# Patient Record
Sex: MALE | Race: BLACK OR AFRICAN AMERICAN | Employment: UNEMPLOYED | URBAN - METROPOLITAN AREA
[De-identification: names, ages, dates, MRNs, and addresses within clinical notes are randomized per-mention and may not be internally consistent; named-entity substitution may affect disease eponyms.]

---

## 2017-02-21 ENCOUNTER — HOSPITAL ENCOUNTER (EMERGENCY)
Age: 32
Discharge: HOME | End: 2017-02-22
Attending: EMERGENCY MEDICINE
Payer: SELF-PAY

## 2017-02-21 DIAGNOSIS — R07.9 CHEST PAIN, UNSPECIFIED TYPE: Primary | ICD-10-CM

## 2017-02-21 PROCEDURE — 96374 THER/PROPH/DIAG INJ IV PUSH: CPT

## 2017-02-21 PROCEDURE — 96375 TX/PRO/DX INJ NEW DRUG ADDON: CPT

## 2017-02-21 PROCEDURE — 93005 ELECTROCARDIOGRAM TRACING: CPT

## 2017-02-21 PROCEDURE — 99283 EMERGENCY DEPT VISIT LOW MDM: CPT

## 2017-02-21 NOTE — LETTER
NOTIFICATION RETURN TO WORK / SCHOOL 
 
2/22/2017 1:21 AM 
 
Mr. Montez Snellen [address] To Whom It May Concern: Montez Snellen is currently under the care of [id number] [place] [address] EMERGENCY DEPT. He will return to work/school on: 2/24/2017 If there are questions or concerns please have the patient contact our office.  
 
 
 
Sincerely, 
 
 
 
 
Vanda Pierce MD

## 2017-02-22 ENCOUNTER — APPOINTMENT (OUTPATIENT)
Dept: GENERAL RADIOLOGY | Age: 32
End: 2017-02-22
Attending: EMERGENCY MEDICINE
Payer: SELF-PAY

## 2017-02-22 VITALS
SYSTOLIC BLOOD PRESSURE: 128 MMHG | TEMPERATURE: 98 F | HEIGHT: 74 IN | BODY MASS INDEX: 24.38 KG/M2 | WEIGHT: 190 LBS | RESPIRATION RATE: 14 BRPM | OXYGEN SATURATION: 99 % | HEART RATE: 61 BPM | DIASTOLIC BLOOD PRESSURE: 82 MMHG

## 2017-02-22 LAB
ANION GAP BLD CALC-SCNC: 9 MMOL/L (ref 3–18)
ATRIAL RATE: 58 BPM
BASOPHILS # BLD AUTO: 0 K/UL (ref 0–0.06)
BASOPHILS # BLD: 1 % (ref 0–2)
BUN SERPL-MCNC: 16 MG/DL (ref 7–18)
BUN/CREAT SERPL: 15 (ref 12–20)
CALCIUM SERPL-MCNC: 9.1 MG/DL (ref 8.5–10.1)
CALCULATED P AXIS, ECG09: 73 DEGREES
CALCULATED R AXIS, ECG10: 77 DEGREES
CALCULATED T AXIS, ECG11: 42 DEGREES
CHLORIDE SERPL-SCNC: 105 MMOL/L (ref 100–108)
CK MB CFR SERPL CALC: NORMAL % (ref 0–4)
CK MB SERPL-MCNC: <1 NG/ML (ref 5–25)
CK SERPL-CCNC: 240 U/L (ref 39–308)
CO2 SERPL-SCNC: 26 MMOL/L (ref 21–32)
CREAT SERPL-MCNC: 1.07 MG/DL (ref 0.6–1.3)
D DIMER PPP FEU-MCNC: <0.27 UG/ML(FEU)
DIAGNOSIS, 93000: NORMAL
DIFFERENTIAL METHOD BLD: ABNORMAL
EOSINOPHIL # BLD: 0.1 K/UL (ref 0–0.4)
EOSINOPHIL NFR BLD: 2 % (ref 0–5)
ERYTHROCYTE [DISTWIDTH] IN BLOOD BY AUTOMATED COUNT: 13.6 % (ref 11.6–14.5)
GLUCOSE SERPL-MCNC: 100 MG/DL (ref 74–99)
HCT VFR BLD AUTO: 39.2 % (ref 36–48)
HGB BLD-MCNC: 12.8 G/DL (ref 13–16)
LYMPHOCYTES # BLD AUTO: 59 % (ref 21–52)
LYMPHOCYTES # BLD: 3.2 K/UL (ref 0.9–3.6)
MCH RBC QN AUTO: 30.6 PG (ref 24–34)
MCHC RBC AUTO-ENTMCNC: 32.7 G/DL (ref 31–37)
MCV RBC AUTO: 93.8 FL (ref 74–97)
MONOCYTES # BLD: 0.3 K/UL (ref 0.05–1.2)
MONOCYTES NFR BLD AUTO: 5 % (ref 3–10)
NEUTS SEG # BLD: 1.8 K/UL (ref 1.8–8)
NEUTS SEG NFR BLD AUTO: 33 % (ref 40–73)
P-R INTERVAL, ECG05: 154 MS
PLATELET # BLD AUTO: 202 K/UL (ref 135–420)
PMV BLD AUTO: 10.1 FL (ref 9.2–11.8)
POTASSIUM SERPL-SCNC: 4.1 MMOL/L (ref 3.5–5.5)
Q-T INTERVAL, ECG07: 400 MS
QRS DURATION, ECG06: 84 MS
QTC CALCULATION (BEZET), ECG08: 392 MS
RBC # BLD AUTO: 4.18 M/UL (ref 4.7–5.5)
SODIUM SERPL-SCNC: 140 MMOL/L (ref 136–145)
TROPONIN I SERPL-MCNC: <0.02 NG/ML (ref 0–0.06)
VENTRICULAR RATE, ECG03: 58 BPM
WBC # BLD AUTO: 5.3 K/UL (ref 4.6–13.2)

## 2017-02-22 PROCEDURE — 85025 COMPLETE CBC W/AUTO DIFF WBC: CPT | Performed by: EMERGENCY MEDICINE

## 2017-02-22 PROCEDURE — 74011000250 HC RX REV CODE- 250: Performed by: EMERGENCY MEDICINE

## 2017-02-22 PROCEDURE — 82550 ASSAY OF CK (CPK): CPT | Performed by: EMERGENCY MEDICINE

## 2017-02-22 PROCEDURE — 74011250636 HC RX REV CODE- 250/636: Performed by: EMERGENCY MEDICINE

## 2017-02-22 PROCEDURE — 71020 XR CHEST PA LAT: CPT

## 2017-02-22 PROCEDURE — 85379 FIBRIN DEGRADATION QUANT: CPT | Performed by: EMERGENCY MEDICINE

## 2017-02-22 PROCEDURE — 80048 BASIC METABOLIC PNL TOTAL CA: CPT | Performed by: EMERGENCY MEDICINE

## 2017-02-22 RX ORDER — FAMOTIDINE 20 MG/1
20 TABLET, FILM COATED ORAL DAILY
Qty: 10 TAB | Refills: 0 | Status: SHIPPED | OUTPATIENT
Start: 2017-02-22 | End: 2017-03-04

## 2017-02-22 RX ORDER — KETOROLAC TROMETHAMINE 30 MG/ML
30 INJECTION, SOLUTION INTRAMUSCULAR; INTRAVENOUS
Status: COMPLETED | OUTPATIENT
Start: 2017-02-22 | End: 2017-02-22

## 2017-02-22 RX ORDER — FAMOTIDINE 10 MG/ML
20 INJECTION INTRAVENOUS
Status: COMPLETED | OUTPATIENT
Start: 2017-02-22 | End: 2017-02-22

## 2017-02-22 RX ORDER — KETOROLAC TROMETHAMINE 10 MG/1
10 TABLET, FILM COATED ORAL
Qty: 14 TAB | Refills: 0 | Status: SHIPPED | OUTPATIENT
Start: 2017-02-22 | End: 2019-11-14

## 2017-02-22 RX ADMIN — KETOROLAC TROMETHAMINE 30 MG: 30 INJECTION, SOLUTION INTRAMUSCULAR at 00:30

## 2017-02-22 RX ADMIN — FAMOTIDINE 20 MG: 10 INJECTION, SOLUTION INTRAVENOUS at 00:30

## 2017-02-22 NOTE — DISCHARGE INSTRUCTIONS
Return for any new or worsening pain, fever, shortness of breath, vomiting, decreased fluid intake, weakness, numbness, dizziness, or any change or concerns. Chest Pain: Care Instructions  Your Care Instructions  There are many things that can cause chest pain. Some are not serious and will get better on their own in a few days. But some kinds of chest pain need more testing and treatment. Your doctor may have recommended a follow-up visit in the next 8 to 12 hours. If you are not getting better, you may need more tests or treatment. Even though your doctor has released you, you still need to watch for any problems. The doctor carefully checked you, but sometimes problems can develop later. If you have new symptoms or if your symptoms do not get better, get medical care right away. If you have worse or different chest pain or pressure that lasts more than 5 minutes or you passed out (lost consciousness), call 911 or seek other emergency help right away. A medical visit is only one step in your treatment. Even if you feel better, you still need to do what your doctor recommends, such as going to all suggested follow-up appointments and taking medicines exactly as directed. This will help you recover and help prevent future problems. How can you care for yourself at home? · Rest until you feel better. · Take your medicine exactly as prescribed. Call your doctor if you think you are having a problem with your medicine. · Do not drive after taking a prescription pain medicine. When should you call for help? Call 911 if:  · You passed out (lost consciousness). · You have severe difficulty breathing. · You have symptoms of a heart attack. These may include:  ¨ Chest pain or pressure, or a strange feeling in your chest.  ¨ Sweating. ¨ Shortness of breath. ¨ Nausea or vomiting.   ¨ Pain, pressure, or a strange feeling in your back, neck, jaw, or upper belly or in one or both shoulders or arms.  ¨ Lightheadedness or sudden weakness. ¨ A fast or irregular heartbeat. After you call 911, the  may tell you to chew 1 adult-strength or 2 to 4 low-dose aspirin. Wait for an ambulance. Do not try to drive yourself. Call your doctor today if:  · You have any trouble breathing. · Your chest pain gets worse. · You are dizzy or lightheaded, or you feel like you may faint. · You are not getting better as expected. · You are having new or different chest pain. Where can you learn more? Go to http://cayla-kathy.info/. Enter A120 in the search box to learn more about \"Chest Pain: Care Instructions. \"  Current as of: May 27, 2016  Content Version: 11.1  © 6388-4445 [employer]. Care instructions adapted under license by [employer] (which disclaims liability or warranty for this information). If you have questions about a medical condition or this instruction, always ask your healthcare professional. [place] [address] any warranty or liability for your use of this information.

## 2017-02-22 NOTE — ED PROVIDER NOTES
HPI Comments: 12:09 AM Jani Delgadillo is a 33 y.o. male with history of acid reflux who presents to the ED c/o midsternum chest pain onset 0130 this morning. Pt states pain eases when sitting up, and loss of appetite due to pain. Pain also persists in lower back. Pt states he has left arm numbness from his fingers to his elbow. Pt denies any abdomen pain, nausea, fever, cough, leg swelling, or any other symptoms at this time. Pt admits to smoking, but no alcohol use. PCP: None    The history is provided by the patient. Past Medical History:   Diagnosis Date    Gastroenteritis        History reviewed. No pertinent surgical history. History reviewed. No pertinent family history. Social History     Social History    Marital status: SINGLE     Spouse name: N/A    Number of children: N/A    Years of education: N/A     Occupational History    Not on file. Social History Main Topics    Smoking status: Never Smoker    Smokeless tobacco: Not on file    Alcohol use No    Drug use: Yes     Special: Marijuana      Comment: States daily marijuana use.  Sexual activity: Not on file     Other Topics Concern    Not on file     Social History Narrative         ALLERGIES: Review of patient's allergies indicates no known allergies. Review of Systems   Constitutional: Negative for diaphoresis and fever. HENT: Negative for congestion. Respiratory: Negative for cough and shortness of breath. Cardiovascular: Positive for chest pain. Negative for leg swelling. Gastrointestinal: Negative for abdominal pain and nausea. Musculoskeletal: Positive for back pain (lower). Skin: Negative for rash. Neurological: Positive for numbness (left arm from finger to elbow). Negative for dizziness. All other systems reviewed and are negative.       Vitals:    02/21/17 2325 02/22/17 0129   BP: 132/80 128/82   Pulse: 67 61   Resp: 17 14   Temp: 97.8 °F (36.6 °C) 98 °F (36.7 °C)   SpO2: 98% 99% Weight: 86.2 kg (190 lb)    Height: 6' 2\" (1.88 m)             Physical Exam   Constitutional: He is oriented to person, place, and time. He appears well-developed and well-nourished. HENT:   Head: Normocephalic and atraumatic. Eyes: Pupils are equal, round, and reactive to light. Neck: Neck supple. Cardiovascular: Normal rate. No murmur heard. Pulmonary/Chest: Effort normal. He has no wheezes. Abdominal: Soft. There is no tenderness. Musculoskeletal: He exhibits no tenderness. Neurological: He is alert and oriented to person, place, and time. Skin: No pallor. Nursing note and vitals reviewed.        [facility]  ED Course       Procedures      Vitals:  Patient Vitals for the past 12 hrs:   Temp Pulse Resp BP SpO2   02/22/17 0129 98 °F (36.7 °C) 61 14 128/82 99 %   02/21/17 2325 97.8 °F (36.6 °C) 67 17 132/80 98 %       Pulse ox reviewed    Medications ordered:   Medications   famotidine (PF) (PEPCID) injection 20 mg (20 mg IntraVENous Given 2/22/17 0030)   ketorolac (TORADOL) injection 30 mg (30 mg IntraVENous Given 2/22/17 0030)         Lab findings:  Recent Results (from the past 12 hour(s))   EKG, 12 LEAD, INITIAL    Collection Time: 02/21/17 11:22 PM   Result Value Ref Range    Ventricular Rate 58 BPM    Atrial Rate 58 BPM    P-R Interval 154 ms    QRS Duration 84 ms    Q-T Interval 400 ms    QTC Calculation (Bezet) 392 ms    Calculated P Axis 73 degrees    Calculated R Axis 77 degrees    Calculated T Axis 42 degrees    Diagnosis       Sinus bradycardia  Septal infarct , age undetermined  Abnormal ECG  No previous ECGs available     CBC WITH AUTOMATED DIFF    Collection Time: 02/22/17 12:30 AM   Result Value Ref Range    WBC 5.3 4.6 - 13.2 K/uL    RBC 4.18 (L) 4.70 - 5.50 M/uL    HGB 12.8 (L) 13.0 - 16.0 g/dL    HCT 39.2 36.0 - 48.0 %    MCV 93.8 74.0 - 97.0 FL    MCH 30.6 24.0 - 34.0 PG    MCHC 32.7 31.0 - 37.0 g/dL    RDW 13.6 11.6 - 14.5 %    PLATELET 590 525 - 311 K/uL    MPV 10.1 9.2 - 11.8 FL    NEUTROPHILS 33 (L) 40 - 73 %    LYMPHOCYTES 59 (H) 21 - 52 %    MONOCYTES 5 3 - 10 %    EOSINOPHILS 2 0 - 5 %    BASOPHILS 1 0 - 2 %    ABS. NEUTROPHILS 1.8 1.8 - 8.0 K/UL    ABS. LYMPHOCYTES 3.2 0.9 - 3.6 K/UL    ABS. MONOCYTES 0.3 0.05 - 1.2 K/UL    ABS. EOSINOPHILS 0.1 0.0 - 0.4 K/UL    ABS. BASOPHILS 0.0 0.0 - 0.06 K/UL    DF AUTOMATED     METABOLIC PANEL, BASIC    Collection Time: 02/22/17 12:30 AM   Result Value Ref Range    Sodium 140 136 - 145 mmol/L    Potassium 4.1 3.5 - 5.5 mmol/L    Chloride 105 100 - 108 mmol/L    CO2 26 21 - 32 mmol/L    Anion gap 9 3.0 - 18 mmol/L    Glucose 100 (H) 74 - 99 mg/dL    BUN 16 7.0 - 18 MG/DL    Creatinine 1.07 0.6 - 1.3 MG/DL    BUN/Creatinine ratio 15 12 - 20      GFR est AA >60 >60 ml/min/1.73m2    GFR est non-AA >60 >60 ml/min/1.73m2    Calcium 9.1 8.5 - 10.1 MG/DL   CARDIAC PANEL,(CK, CKMB & TROPONIN)    Collection Time: 02/22/17 12:30 AM   Result Value Ref Range     39 - 308 U/L    CK - MB <1.0 <3.6 ng/ml    CK-MB Index CANNOT BE CALCULATED 0.0 - 4.0 %    Troponin-I, Qt. <0.02 0.00 - 0.06 NG/ML   D DIMER    Collection Time: 02/22/17 12:30 AM   Result Value Ref Range    D DIMER <0.27 <0.46 ug/ml(FEU)           X-Ray, CT or other radiology findings or impressions:  XR CHEST PA LAT    (Results Pending)       Progress notes, Consult notes or additional Procedure notes:   1:21 AM I have reassessed the patient and discussed their results and diagnosis. Not c/w cad/pe/pna/ptx. No emc. Pt will be discharged in stable condition. Patient is to return to emergency department if any new or worsening condition. Patient understands and verbalizes agreement with plan. Disposition:  Diagnosis:   1.  Chest pain, unspecified type        Disposition: Discharged    Follow-up Information     Follow up With Details Comments Contact Info    [facility] Schedule an appointment as soon as possible for a visit in 2 days  [address] [facility]  [phone]           Discharge Medication List as of 2/22/2017  1:22 AM      START taking these medications    Details   famotidine (PEPCID) 20 mg tablet Take 1 Tab by mouth daily for 10 days. , Print, Disp-10 Tab, R-0      ketorolac (TORADOL) 10 mg tablet Take 1 Tab by mouth every eight (8) hours as needed for Pain., Print, Disp-14 Tab, R-0         CONTINUE these medications which have NOT CHANGED    Details   ondansetron hcl (ZOFRAN) 4 mg tablet Take 1 Tab by mouth every eight (8) hours as needed for Nausea. Print, 4 mg, Disp-12 Tab, R-0               SCRIBE ATTESTATION STATEMENT  Documented by: Stoney Layne scribing for, and in the presence of, Rc Saeed MD 12:16 AM     Signed by: Payam Polo, 02/22/17 12:16 AM    PROVIDER ATTESTATION STATEMENT  I personally performed the services described in the documentation, reviewed the documentation, as recorded by the scribe in my presence, and it accurately and completely records my words and actions.   Rc Saeed MD

## 2017-02-22 NOTE — ED NOTES
Reports a decrease in pain at the present time. I have reviewed discharge instructions with the patient and spouse. The patient and spouse verbalized understanding. Ambulatory from ED. Patient armband removed and shredded.

## 2017-02-22 NOTE — ED NOTES
Pt describes chest pain as more epigastric, does suffer from GERD but Tums and milk have not relieved the pain at all.

## 2017-02-22 NOTE — ED TRIAGE NOTES
Sunday Morning tingling feeling from elbow to forearm. Monday same pain worse. Onset today at 0100 chest pain.

## 2018-10-07 ENCOUNTER — HOSPITAL ENCOUNTER (EMERGENCY)
Age: 33
Discharge: HOME | End: 2018-10-07
Attending: EMERGENCY MEDICINE
Payer: SELF-PAY

## 2018-10-07 VITALS
TEMPERATURE: 97.4 F | HEIGHT: 73 IN | SYSTOLIC BLOOD PRESSURE: 108 MMHG | OXYGEN SATURATION: 99 % | BODY MASS INDEX: 23.86 KG/M2 | RESPIRATION RATE: 27 BRPM | DIASTOLIC BLOOD PRESSURE: 61 MMHG | HEART RATE: 64 BPM | WEIGHT: 180 LBS

## 2018-10-07 DIAGNOSIS — R11.2 NAUSEA AND VOMITING, INTRACTABILITY OF VOMITING NOT SPECIFIED, UNSPECIFIED VOMITING TYPE: ICD-10-CM

## 2018-10-07 DIAGNOSIS — R55 SYNCOPE AND COLLAPSE: ICD-10-CM

## 2018-10-07 DIAGNOSIS — R10.84 GENERALIZED ABDOMINAL PAIN: Primary | ICD-10-CM

## 2018-10-07 LAB
ALBUMIN SERPL-MCNC: 4.5 G/DL (ref 3.4–5)
ALBUMIN/GLOB SERPL: 1.2 {RATIO} (ref 0.8–1.7)
ALP SERPL-CCNC: 64 U/L (ref 45–117)
ALT SERPL-CCNC: 25 U/L (ref 16–61)
ANION GAP SERPL CALC-SCNC: 10 MMOL/L (ref 3–18)
APPEARANCE UR: CLEAR
AST SERPL-CCNC: 20 U/L (ref 15–37)
BASOPHILS # BLD: 0 K/UL (ref 0–0.1)
BASOPHILS NFR BLD: 0 % (ref 0–2)
BILIRUB SERPL-MCNC: 0.4 MG/DL (ref 0.2–1)
BILIRUB UR QL: NEGATIVE
BUN SERPL-MCNC: 16 MG/DL (ref 7–18)
BUN/CREAT SERPL: 15 (ref 12–20)
CALCIUM SERPL-MCNC: 9.4 MG/DL (ref 8.5–10.1)
CHLORIDE SERPL-SCNC: 103 MMOL/L (ref 100–108)
CK MB CFR SERPL CALC: NORMAL % (ref 0–4)
CK MB SERPL-MCNC: <1 NG/ML (ref 5–25)
CK SERPL-CCNC: 202 U/L (ref 39–308)
CO2 SERPL-SCNC: 23 MMOL/L (ref 21–32)
COLOR UR: YELLOW
CREAT SERPL-MCNC: 1.08 MG/DL (ref 0.6–1.3)
DIFFERENTIAL METHOD BLD: ABNORMAL
EOSINOPHIL # BLD: 0 K/UL (ref 0–0.4)
EOSINOPHIL NFR BLD: 0 % (ref 0–5)
ERYTHROCYTE [DISTWIDTH] IN BLOOD BY AUTOMATED COUNT: 13.5 % (ref 11.6–14.5)
GLOBULIN SER CALC-MCNC: 3.9 G/DL (ref 2–4)
GLUCOSE SERPL-MCNC: 102 MG/DL (ref 74–99)
GLUCOSE UR STRIP.AUTO-MCNC: NEGATIVE MG/DL
HCT VFR BLD AUTO: 39.6 % (ref 36–48)
HGB BLD-MCNC: 14 G/DL (ref 13–16)
HGB UR QL STRIP: NEGATIVE
KETONES UR QL STRIP.AUTO: 80 MG/DL
LEUKOCYTE ESTERASE UR QL STRIP.AUTO: NEGATIVE
LIPASE SERPL-CCNC: 88 U/L (ref 73–393)
LYMPHOCYTES # BLD: 1.7 K/UL (ref 0.9–3.6)
LYMPHOCYTES NFR BLD: 22 % (ref 21–52)
MCH RBC QN AUTO: 31.5 PG (ref 24–34)
MCHC RBC AUTO-ENTMCNC: 35.4 G/DL (ref 31–37)
MCV RBC AUTO: 89.2 FL (ref 74–97)
MONOCYTES # BLD: 0.3 K/UL (ref 0.05–1.2)
MONOCYTES NFR BLD: 4 % (ref 3–10)
NEUTS SEG # BLD: 5.7 K/UL (ref 1.8–8)
NEUTS SEG NFR BLD: 74 % (ref 40–73)
NITRITE UR QL STRIP.AUTO: NEGATIVE
PH UR STRIP: 5.5 [PH] (ref 5–8)
PLATELET # BLD AUTO: 227 K/UL (ref 135–420)
PMV BLD AUTO: 10.1 FL (ref 9.2–11.8)
POTASSIUM SERPL-SCNC: 4.1 MMOL/L (ref 3.5–5.5)
PROT SERPL-MCNC: 8.4 G/DL (ref 6.4–8.2)
PROT UR STRIP-MCNC: NEGATIVE MG/DL
RBC # BLD AUTO: 4.44 M/UL (ref 4.7–5.5)
SODIUM SERPL-SCNC: 136 MMOL/L (ref 136–145)
SP GR UR REFRACTOMETRY: 1.02 (ref 1–1.03)
TROPONIN I SERPL-MCNC: <0.02 NG/ML (ref 0–0.04)
UROBILINOGEN UR QL STRIP.AUTO: 1 EU/DL (ref 0.2–1)
WBC # BLD AUTO: 7.6 K/UL (ref 4.6–13.2)

## 2018-10-07 PROCEDURE — C9113 INJ PANTOPRAZOLE SODIUM, VIA: HCPCS | Performed by: EMERGENCY MEDICINE

## 2018-10-07 PROCEDURE — 99285 EMERGENCY DEPT VISIT HI MDM: CPT

## 2018-10-07 PROCEDURE — 96361 HYDRATE IV INFUSION ADD-ON: CPT

## 2018-10-07 PROCEDURE — 83690 ASSAY OF LIPASE: CPT | Performed by: EMERGENCY MEDICINE

## 2018-10-07 PROCEDURE — 82550 ASSAY OF CK (CPK): CPT | Performed by: PHYSICIAN ASSISTANT

## 2018-10-07 PROCEDURE — 96374 THER/PROPH/DIAG INJ IV PUSH: CPT

## 2018-10-07 PROCEDURE — 74011250636 HC RX REV CODE- 250/636: Performed by: EMERGENCY MEDICINE

## 2018-10-07 PROCEDURE — 96375 TX/PRO/DX INJ NEW DRUG ADDON: CPT

## 2018-10-07 PROCEDURE — 93005 ELECTROCARDIOGRAM TRACING: CPT

## 2018-10-07 PROCEDURE — 85025 COMPLETE CBC W/AUTO DIFF WBC: CPT | Performed by: EMERGENCY MEDICINE

## 2018-10-07 PROCEDURE — 81003 URINALYSIS AUTO W/O SCOPE: CPT | Performed by: PHYSICIAN ASSISTANT

## 2018-10-07 PROCEDURE — 80053 COMPREHEN METABOLIC PANEL: CPT | Performed by: EMERGENCY MEDICINE

## 2018-10-07 RX ORDER — PANTOPRAZOLE SODIUM 40 MG/1
40 TABLET, DELAYED RELEASE ORAL DAILY
Qty: 20 TAB | Refills: 0 | Status: SHIPPED | OUTPATIENT
Start: 2018-10-07 | End: 2018-10-27

## 2018-10-07 RX ORDER — ONDANSETRON 2 MG/ML
4 INJECTION INTRAMUSCULAR; INTRAVENOUS
Status: COMPLETED | OUTPATIENT
Start: 2018-10-07 | End: 2018-10-07

## 2018-10-07 RX ORDER — ONDANSETRON 4 MG/1
4 TABLET, FILM COATED ORAL
Qty: 12 TAB | Refills: 0 | Status: SHIPPED | OUTPATIENT
Start: 2018-10-07 | End: 2019-11-14

## 2018-10-07 RX ORDER — PANTOPRAZOLE SODIUM 40 MG/10ML
40 INJECTION, POWDER, LYOPHILIZED, FOR SOLUTION INTRAVENOUS
Status: COMPLETED | OUTPATIENT
Start: 2018-10-07 | End: 2018-10-07

## 2018-10-07 RX ADMIN — SODIUM CHLORIDE 1000 ML: 900 INJECTION, SOLUTION INTRAVENOUS at 15:29

## 2018-10-07 RX ADMIN — PANTOPRAZOLE SODIUM 40 MG: 40 INJECTION, POWDER, FOR SOLUTION INTRAVENOUS at 15:34

## 2018-10-07 RX ADMIN — ONDANSETRON 4 MG: 2 INJECTION INTRAMUSCULAR; INTRAVENOUS at 15:31

## 2018-10-07 NOTE — ED TRIAGE NOTES
Per EMS, Patient states he was eating hot foods yesterday with his friends and drinking. He is complaining of abdominal pain and a headache.  Patient has a hx of GERD

## 2018-10-07 NOTE — ED NOTES
Patient resting comfortably at this time on the stretcher and does not appear to be in any distress.

## 2018-10-07 NOTE — ED PROVIDER NOTES
EMERGENCY DEPARTMENT HISTORY AND PHYSICAL EXAM 
 
Date: 10/7/2018 Patient Name: Marcos Randall History of Presenting Illness Chief Complaint Patient presents with  Abdominal Pain History Provided By: Patient Chief Complaint: syncope, abdominal pain, n/v Duration: 1 Days Timing:  Acute Location: generalized Quality: Aching Severity: 8 out of 10 Modifying Factors: na 
Associated Symptoms: fatigue, Additional History (Context): Marcos Randall is a 27 y.o. male with history of  No significant past medical history who presents to the ED with a complaint of syncope, abdominal pain, nausea and vomiting and body ache. Pt states he was drinking and smoking with friends last night when he syncopated 2 times. He states he only had 2 beers last night. This morning on waking he experienced abdominal pain and then vomited 2 times. Abdominal pain is generalized. He states he as no appetite. He denies fever, CP, SOB, diarrhea, constipation,. He denies drug use. PCP: None Current Outpatient Prescriptions Medication Sig Dispense Refill  pantoprazole (PROTONIX) 40 mg tablet Take 1 Tab by mouth daily for 20 days. 20 Tab 0  
 ondansetron hcl (ZOFRAN) 4 mg tablet Take 1 Tab by mouth every eight (8) hours as needed for Nausea. 12 Tab 0  
 ketorolac (TORADOL) 10 mg tablet Take 1 Tab by mouth every eight (8) hours as needed for Pain. 14 Tab 0 Past History Past Medical History: 
Past Medical History:  
Diagnosis Date  Gastroenteritis Past Surgical History: No past surgical history on file. Family History: No family history on file. Social History: 
Social History Substance Use Topics  Smoking status: Never Smoker  Smokeless tobacco: Not on file  Alcohol use No  
 
 
Allergies: Allergies Allergen Reactions  Ibuprofen Hives Review of Systems Review of Systems Constitutional: Positive for fatigue. Negative for fever. HENT: Negative for congestion. Eyes: Negative for pain. Respiratory: Negative for cough and shortness of breath. Cardiovascular: Negative for chest pain. Gastrointestinal: Positive for abdominal pain, nausea and vomiting. Negative for diarrhea. Genitourinary: Negative for dysuria. Musculoskeletal: Negative for back pain. Skin: Negative for wound. Neurological: Positive for syncope. Negative for dizziness and headaches. All other systems reviewed and are negative. All Other Systems Negative Physical Exam  
 
Vitals:  
 10/07/18 1530 10/07/18 1545 10/07/18 1600 10/07/18 1615 BP: 108/64 119/77 106/75 112/70 Pulse: (!) 58 60 60 (!) 56 Resp: 9 10 15 15 Temp:      
SpO2: 100% 100% 99% 100% Weight:      
Height:      
 
Physical Exam  
Constitutional: He is oriented to person, place, and time. He appears well-developed and well-nourished. No distress. HENT:  
Head: Normocephalic and atraumatic. Nose: Nose normal.  
Eyes: Conjunctivae and EOM are normal. Pupils are equal, round, and reactive to light. Neck: Normal range of motion. Cardiovascular: Normal rate. Pulmonary/Chest: Effort normal and breath sounds normal. No respiratory distress. He has no wheezes. Abdominal: Soft. Normal appearance. He exhibits no distension. Bowel sounds are increased. There is generalized tenderness. There is no rigidity, no rebound, no guarding, no CVA tenderness, no tenderness at McBurney's point and negative Finch's sign. Musculoskeletal: Normal range of motion. Neurological: He is alert and oriented to person, place, and time. Skin: Skin is warm and dry. Psychiatric: He has a normal mood and affect. His behavior is normal.  
Vitals reviewed. Diagnostic Study Results Labs - Recent Results (from the past 12 hour(s)) CBC WITH AUTOMATED DIFF Collection Time: 10/07/18  1:37 PM  
Result Value Ref Range WBC 7.6 4.6 - 13.2 K/uL  
 RBC 4.44 (L) 4.70 - 5.50 M/uL HGB 14.0 13.0 - 16.0 g/dL HCT 39.6 36.0 - 48.0 % MCV 89.2 74.0 - 97.0 FL  
 MCH 31.5 24.0 - 34.0 PG  
 MCHC 35.4 31.0 - 37.0 g/dL  
 RDW 13.5 11.6 - 14.5 % PLATELET 307 935 - 440 K/uL MPV 10.1 9.2 - 11.8 FL  
 NEUTROPHILS 74 (H) 40 - 73 % LYMPHOCYTES 22 21 - 52 % MONOCYTES 4 3 - 10 % EOSINOPHILS 0 0 - 5 % BASOPHILS 0 0 - 2 %  
 ABS. NEUTROPHILS 5.7 1.8 - 8.0 K/UL  
 ABS. LYMPHOCYTES 1.7 0.9 - 3.6 K/UL  
 ABS. MONOCYTES 0.3 0.05 - 1.2 K/UL  
 ABS. EOSINOPHILS 0.0 0.0 - 0.4 K/UL  
 ABS. BASOPHILS 0.0 0.0 - 0.1 K/UL  
 DF AUTOMATED METABOLIC PANEL, COMPREHENSIVE Collection Time: 10/07/18  1:37 PM  
Result Value Ref Range Sodium 136 136 - 145 mmol/L Potassium 4.1 3.5 - 5.5 mmol/L Chloride 103 100 - 108 mmol/L  
 CO2 23 21 - 32 mmol/L Anion gap 10 3.0 - 18 mmol/L Glucose 102 (H) 74 - 99 mg/dL BUN 16 7.0 - 18 MG/DL Creatinine 1.08 0.6 - 1.3 MG/DL  
 BUN/Creatinine ratio 15 12 - 20 GFR est AA >60 >60 ml/min/1.73m2 GFR est non-AA >60 >60 ml/min/1.73m2 Calcium 9.4 8.5 - 10.1 MG/DL Bilirubin, total 0.4 0.2 - 1.0 MG/DL  
 ALT (SGPT) 25 16 - 61 U/L  
 AST (SGOT) 20 15 - 37 U/L Alk. phosphatase 64 45 - 117 U/L Protein, total 8.4 (H) 6.4 - 8.2 g/dL Albumin 4.5 3.4 - 5.0 g/dL Globulin 3.9 2.0 - 4.0 g/dL A-G Ratio 1.2 0.8 - 1.7 LIPASE Collection Time: 10/07/18  1:37 PM  
Result Value Ref Range Lipase 88 73 - 393 U/L  
CARDIAC PANEL,(CK, CKMB & TROPONIN) Collection Time: 10/07/18  1:37 PM  
Result Value Ref Range  39 - 308 U/L  
 CK - MB <1.0 <3.6 ng/ml CK-MB Index  0.0 - 4.0 % CALCULATION NOT PERFORMED WHEN RESULT IS BELOW LINEAR LIMIT Troponin-I, Qt. <0.02 0.0 - 0.045 NG/ML  
URINALYSIS W/ RFLX MICROSCOPIC Collection Time: 10/07/18  3:37 PM  
Result Value Ref Range Color YELLOW Appearance CLEAR Specific gravity 1.023 1.005 - 1.030    
 pH (UA) 5.5 5.0 - 8.0 Protein NEGATIVE  NEG mg/dL Glucose NEGATIVE  NEG mg/dL Ketone 80 (A) NEG mg/dL Bilirubin NEGATIVE  NEG Blood NEGATIVE  NEG Urobilinogen 1.0 0.2 - 1.0 EU/dL Nitrites NEGATIVE  NEG Leukocyte Esterase NEGATIVE  NEG    
EKG, 12 LEAD, INITIAL Collection Time: 10/07/18  3:44 PM  
Result Value Ref Range Ventricular Rate 56 BPM  
 Atrial Rate 56 BPM  
 P-R Interval 154 ms QRS Duration 88 ms Q-T Interval 434 ms QTC Calculation (Bezet) 418 ms Calculated P Axis 81 degrees Calculated R Axis 80 degrees Calculated T Axis 56 degrees Diagnosis Sinus bradycardia Anteroseptal infarct (cited on or before 21-FEB-2017) Abnormal ECG When compared with ECG of 21-FEB-2017 23:22, 
Questionable change in initial forces of Anterior leads Radiologic Studies - No orders to display CT Results  (Last 48 hours) None CXR Results  (Last 48 hours) None Medical Decision Making I am the first provider for this patient. I reviewed the vital signs, available nursing notes, past medical history, past surgical history, family history and social history. Vital Signs-Reviewed the patient's vital signs. Pulse Oximetry Analysis - 100% on RA Cardiac Monitor: 
Rate: 56 Rhythm:sinus radha EKG: Sinus bradycardia, no ST elevation, delta waves or RSR in V1 V2 noted. Records Reviewed: Old Medical Records Procedures: 
Procedures Provider Notes (Medical Decision Making):  
 
Pt states improvement in symptoms after medication administration. Labs still pending. Labs Reviewed CBC WITH AUTOMATED DIFF - Abnormal; Notable for the following:   
    Result Value RBC 4.44 (*) NEUTROPHILS 74 (*) All other components within normal limits METABOLIC PANEL, COMPREHENSIVE - Abnormal; Notable for the following:   
 Glucose 102 (*) Protein, total 8.4 (*) All other components within normal limits URINALYSIS W/ RFLX MICROSCOPIC - Abnormal; Notable for the following:   
 Ketone 80 (*) All other components within normal limits LIPASE CARDIAC PANEL,(CK, CKMB & TROPONIN) 5:23 PM Pt feeling much better. No tilt on orthostatics. WIll discharge home at this time MED RECONCILIATION: 
No current facility-administered medications for this encounter. Current Outpatient Prescriptions Medication Sig  pantoprazole (PROTONIX) 40 mg tablet Take 1 Tab by mouth daily for 20 days.  ondansetron hcl (ZOFRAN) 4 mg tablet Take 1 Tab by mouth every eight (8) hours as needed for Nausea.  ketorolac (TORADOL) 10 mg tablet Take 1 Tab by mouth every eight (8) hours as needed for Pain. Disposition: 
discharge DISCHARGE NOTE:  
 
Pt has been reexamined. Patient has no new complaints, changes, or physical findings. Care plan outlined and precautions discussed. Results of visit were reviewed with the patient. All medications were reviewed with the patient; will d/c home with antiemetics and protonix. All of pt's questions and concerns were addressed. Patient was instructed and agrees to follow up with PCP/Cardiology, as well as to return to the ED upon further deterioration. Patient is ready to go home. Follow-up Information Follow up With Details Comments Contact Info [address] Call in 2 days follow up Rufino Johnson [address] [facility] [address] 
[phone] Ju Pitts MD Call As needed, follow up [address] [place] [address] [address] [facility] 
[phone] [facility] EMERGENCY DEPT  If symptoms worsen [address] [place] [address] [address] Current Discharge Medication List  
  
START taking these medications Details  
pantoprazole (PROTONIX) 40 mg tablet Take 1 Tab by mouth daily for 20 days. Qty: 20 Tab, Refills: 0 CONTINUE these medications which have CHANGED Details ondansetron hcl (ZOFRAN) 4 mg tablet Take 1 Tab by mouth every eight (8) hours as needed for Nausea. Qty: 12 Tab, Refills: 0 Diagnosis Clinical Impression: 1. Generalized abdominal pain 2. Syncope and collapse 3. Nausea and vomiting, intractability of vomiting not specified, unspecified vomiting type

## 2018-10-07 NOTE — Clinical Note
Increase fluids Take medications as prescribed Tylenol/Motrin for pain FOllow up with PCP Follow up with Cardiology

## 2018-10-07 NOTE — ED NOTES
I performed a brief evaluation, including history and physical, of the patient here in triage and I have determined that pt will need further treatment and evaluation from the main side ER physician. I have placed initial orders to help in expediting patients care. October 07, 2018 at 1:59 PM - BETSEY Obrien There were no vitals taken for this visit.

## 2018-10-07 NOTE — DISCHARGE INSTRUCTIONS
Abdominal Pain: Care Instructions  Your Care Instructions    Abdominal pain has many possible causes. Some aren't serious and get better on their own in a few days. Others need more testing and treatment. If your pain continues or gets worse, you need to be rechecked and may need more tests to find out what is wrong. You may need surgery to correct the problem. Don't ignore new symptoms, such as fever, nausea and vomiting, urination problems, pain that gets worse, and dizziness. These may be signs of a more serious problem. Your doctor may have recommended a follow-up visit in the next 8 to 12 hours. If you are not getting better, you may need more tests or treatment. The doctor has checked you carefully, but problems can develop later. If you notice any problems or new symptoms, get medical treatment right away. Follow-up care is a key part of your treatment and safety. Be sure to make and go to all appointments, and call your doctor if you are having problems. It's also a good idea to know your test results and keep a list of the medicines you take. How can you care for yourself at home? · Rest until you feel better. · To prevent dehydration, drink plenty of fluids, enough so that your urine is light yellow or clear like water. Choose water and other caffeine-free clear liquids until you feel better. If you have kidney, heart, or liver disease and have to limit fluids, talk with your doctor before you increase the amount of fluids you drink. · If your stomach is upset, eat mild foods, such as rice, dry toast or crackers, bananas, and applesauce. Try eating several small meals instead of two or three large ones. · Wait until 48 hours after all symptoms have gone away before you have spicy foods, alcohol, and drinks that contain caffeine. · Do not eat foods that are high in fat. · Avoid anti-inflammatory medicines such as aspirin, ibuprofen (Advil, Motrin), and naproxen (Aleve).  These can cause stomach upset. Talk to your doctor if you take daily aspirin for another health problem. When should you call for help? Call 911 anytime you think you may need emergency care. For example, call if:    · You passed out (lost consciousness).     · You pass maroon or very bloody stools.     · You vomit blood or what looks like coffee grounds.     · You have new, severe belly pain.    Call your doctor now or seek immediate medical care if:    · Your pain gets worse, especially if it becomes focused in one area of your belly.     · You have a new or higher fever.     · Your stools are black and look like tar, or they have streaks of blood.     · You have unexpected vaginal bleeding.     · You have symptoms of a urinary tract infection. These may include:  ¨ Pain when you urinate. ¨ Urinating more often than usual.  ¨ Blood in your urine.     · You are dizzy or lightheaded, or you feel like you may faint.    Watch closely for changes in your health, and be sure to contact your doctor if:    · You are not getting better after 1 day (24 hours). Where can you learn more? Go to http://cayla-kathy.info/. Enter D710 in the search box to learn more about \"Abdominal Pain: Care Instructions. \"  Current as of: November 20, 2017  Content Version: 11.8  © 0230-7115 [employer]. Care instructions adapted under license by [employer] (which disclaims liability or warranty for this information). If you have questions about a medical condition or this instruction, always ask your healthcare professional. [place] [address] any warranty or liability for your use of this information. Vasovagal Syncope: Care Instructions  Your Care Instructions    Vasovagal syncope (say \"[id number] [id number]\")is sudden dizziness or fainting that can be set off by things such as pain, stress, fear, or trauma.  You may sweat or feel lightheaded, sick to your stomach, or tingly. The problem causes the heart rate to slow and the blood vessels to widen, or dilate, for a short time. When this happens, blood pools in the lower body, and less blood goes to the brain. You can usually get relief by lying down with your legs raised (elevated). This helps more blood to flow to your brain and may help relieve symptoms like feeling dizzy. Some doctors may recommend a technique that involves tensing your fists and arms. This type of fainting is often easy to predict. For example, it happens to some people when they see blood or have to get a shot. They may feel symptoms before they faint. An episode of vasovagal syncope usually responds well to self-care. Other treatment often isn't needed. But if the fainting keeps happening, your doctor may suggest further treatments. Follow-up care is a key part of your treatment and safety. Be sure to make and go to all appointments, and call your doctor if you are having problems. It's also a good idea to know your test results and keep a list of the medicines you take. How can you care for yourself at home? · Drink plenty of fluids to prevent dehydration. If you have kidney, heart, or liver disease and have to limit fluids, talk with your doctor before you increase your fluid intake. · Try to avoid things that you think may set off vasovagal syncope. · Talk to your doctor about any medicines you take. Some medicines may increase the chance of this condition occurring. · If you feel symptoms, lie down with your legs raised. Talk to your doctor about what to do if your symptoms come back. When should you call for help? Call 911 anytime you think you may need emergency care. For example, call if:    · You have symptoms of a heart problem. These may include:  ¨ Chest pain or pressure. ¨ Severe trouble breathing.   ¨ A fast or irregular heartbeat.    Watch closely for changes in your health, and be sure to contact your doctor if:    · You have more episodes of fainting at home.     · You do not get better as expected. Where can you learn more? Go to http://cayla-kathy.info/. Enter L754 in the search box to learn more about \"Vasovagal Syncope: Care Instructions. \"  Current as of: November 20, 2017  Content Version: 11.8  © 1674-0419 [employer]. Care instructions adapted under license by [employer] (which disclaims liability or warranty for this information). If you have questions about a medical condition or this instruction, always ask your healthcare professional. [address] any warranty or liability for your use of this information.

## 2018-10-08 LAB
ATRIAL RATE: 56 BPM
CALCULATED P AXIS, ECG09: 81 DEGREES
CALCULATED R AXIS, ECG10: 80 DEGREES
CALCULATED T AXIS, ECG11: 56 DEGREES
DIAGNOSIS, 93000: NORMAL
P-R INTERVAL, ECG05: 154 MS
Q-T INTERVAL, ECG07: 434 MS
QRS DURATION, ECG06: 88 MS
QTC CALCULATION (BEZET), ECG08: 418 MS
VENTRICULAR RATE, ECG03: 56 BPM

## 2019-01-12 ENCOUNTER — APPOINTMENT (OUTPATIENT)
Dept: CT IMAGING | Age: 34
End: 2019-01-12
Attending: PHYSICIAN ASSISTANT
Payer: SELF-PAY

## 2019-01-12 ENCOUNTER — HOSPITAL ENCOUNTER (EMERGENCY)
Age: 34
Discharge: HOME | End: 2019-01-12
Attending: EMERGENCY MEDICINE
Payer: SELF-PAY

## 2019-01-12 VITALS
BODY MASS INDEX: 21.2 KG/M2 | WEIGHT: 160 LBS | SYSTOLIC BLOOD PRESSURE: 137 MMHG | RESPIRATION RATE: 16 BRPM | DIASTOLIC BLOOD PRESSURE: 82 MMHG | TEMPERATURE: 98.1 F | HEART RATE: 60 BPM | OXYGEN SATURATION: 100 % | HEIGHT: 73 IN

## 2019-01-12 DIAGNOSIS — R10.84 ABDOMINAL PAIN, GENERALIZED: Primary | ICD-10-CM

## 2019-01-12 DIAGNOSIS — E87.6 HYPOKALEMIA: ICD-10-CM

## 2019-01-12 DIAGNOSIS — R11.2 NON-INTRACTABLE VOMITING WITH NAUSEA, UNSPECIFIED VOMITING TYPE: ICD-10-CM

## 2019-01-12 LAB
ALBUMIN SERPL-MCNC: 4.4 G/DL (ref 3.4–5)
ALBUMIN/GLOB SERPL: 1 {RATIO} (ref 0.8–1.7)
ALP SERPL-CCNC: 65 U/L (ref 45–117)
ALT SERPL-CCNC: 24 U/L (ref 16–61)
ANION GAP SERPL CALC-SCNC: 12 MMOL/L (ref 3–18)
APPEARANCE UR: CLEAR
AST SERPL-CCNC: 18 U/L (ref 15–37)
BASOPHILS # BLD: 0 K/UL (ref 0–0.1)
BASOPHILS NFR BLD: 0 % (ref 0–2)
BILIRUB SERPL-MCNC: 1.3 MG/DL (ref 0.2–1)
BILIRUB UR QL: NEGATIVE
BUN SERPL-MCNC: 21 MG/DL (ref 7–18)
BUN/CREAT SERPL: 14 (ref 12–20)
CALCIUM SERPL-MCNC: 9.3 MG/DL (ref 8.5–10.1)
CHLORIDE SERPL-SCNC: 101 MMOL/L (ref 100–108)
CO2 SERPL-SCNC: 23 MMOL/L (ref 21–32)
COLOR UR: YELLOW
CREAT SERPL-MCNC: 1.45 MG/DL (ref 0.6–1.3)
DIFFERENTIAL METHOD BLD: ABNORMAL
EOSINOPHIL # BLD: 0 K/UL (ref 0–0.4)
EOSINOPHIL NFR BLD: 0 % (ref 0–5)
ERYTHROCYTE [DISTWIDTH] IN BLOOD BY AUTOMATED COUNT: 13.2 % (ref 11.6–14.5)
GLOBULIN SER CALC-MCNC: 4.6 G/DL (ref 2–4)
GLUCOSE SERPL-MCNC: 127 MG/DL (ref 74–99)
GLUCOSE UR STRIP.AUTO-MCNC: NEGATIVE MG/DL
HCT VFR BLD AUTO: 42.5 % (ref 36–48)
HGB BLD-MCNC: 15.2 G/DL (ref 13–16)
HGB UR QL STRIP: NEGATIVE
KETONES UR QL STRIP.AUTO: 15 MG/DL
LEUKOCYTE ESTERASE UR QL STRIP.AUTO: NEGATIVE
LIPASE SERPL-CCNC: 98 U/L (ref 73–393)
LYMPHOCYTES # BLD: 2.7 K/UL (ref 0.9–3.6)
LYMPHOCYTES NFR BLD: 36 % (ref 21–52)
MCH RBC QN AUTO: 32.7 PG (ref 24–34)
MCHC RBC AUTO-ENTMCNC: 35.8 G/DL (ref 31–37)
MCV RBC AUTO: 91.4 FL (ref 74–97)
MONOCYTES # BLD: 0.6 K/UL (ref 0.05–1.2)
MONOCYTES NFR BLD: 8 % (ref 3–10)
NEUTS SEG # BLD: 4.2 K/UL (ref 1.8–8)
NEUTS SEG NFR BLD: 56 % (ref 40–73)
NITRITE UR QL STRIP.AUTO: NEGATIVE
PH UR STRIP: 7 [PH] (ref 5–8)
PLATELET # BLD AUTO: 247 K/UL (ref 135–420)
PMV BLD AUTO: 10.4 FL (ref 9.2–11.8)
POTASSIUM SERPL-SCNC: 3.2 MMOL/L (ref 3.5–5.5)
PROT SERPL-MCNC: 9 G/DL (ref 6.4–8.2)
PROT UR STRIP-MCNC: NEGATIVE MG/DL
RBC # BLD AUTO: 4.65 M/UL (ref 4.7–5.5)
SODIUM SERPL-SCNC: 136 MMOL/L (ref 136–145)
SP GR UR REFRACTOMETRY: >1.03 (ref 1–1.03)
UROBILINOGEN UR QL STRIP.AUTO: 2 EU/DL (ref 0.2–1)
WBC # BLD AUTO: 7.5 K/UL (ref 4.6–13.2)

## 2019-01-12 PROCEDURE — 74011250636 HC RX REV CODE- 250/636: Performed by: EMERGENCY MEDICINE

## 2019-01-12 PROCEDURE — 96361 HYDRATE IV INFUSION ADD-ON: CPT

## 2019-01-12 PROCEDURE — 81003 URINALYSIS AUTO W/O SCOPE: CPT

## 2019-01-12 PROCEDURE — 74011250636 HC RX REV CODE- 250/636

## 2019-01-12 PROCEDURE — 96375 TX/PRO/DX INJ NEW DRUG ADDON: CPT

## 2019-01-12 PROCEDURE — 85025 COMPLETE CBC W/AUTO DIFF WBC: CPT

## 2019-01-12 PROCEDURE — 74011250636 HC RX REV CODE- 250/636: Performed by: PHYSICIAN ASSISTANT

## 2019-01-12 PROCEDURE — 96374 THER/PROPH/DIAG INJ IV PUSH: CPT

## 2019-01-12 PROCEDURE — 99284 EMERGENCY DEPT VISIT MOD MDM: CPT

## 2019-01-12 PROCEDURE — 74011000258 HC RX REV CODE- 258: Performed by: PHYSICIAN ASSISTANT

## 2019-01-12 PROCEDURE — 83690 ASSAY OF LIPASE: CPT

## 2019-01-12 PROCEDURE — 74011250637 HC RX REV CODE- 250/637: Performed by: PHYSICIAN ASSISTANT

## 2019-01-12 PROCEDURE — 80053 COMPREHEN METABOLIC PANEL: CPT

## 2019-01-12 PROCEDURE — 74177 CT ABD & PELVIS W/CONTRAST: CPT

## 2019-01-12 PROCEDURE — 74011636320 HC RX REV CODE- 636/320: Performed by: EMERGENCY MEDICINE

## 2019-01-12 RX ORDER — MORPHINE SULFATE 4 MG/ML
4 INJECTION INTRAVENOUS
Status: COMPLETED | OUTPATIENT
Start: 2019-01-12 | End: 2019-01-12

## 2019-01-12 RX ORDER — ONDANSETRON 2 MG/ML
INJECTION INTRAMUSCULAR; INTRAVENOUS
Status: COMPLETED
Start: 2019-01-12 | End: 2019-01-12

## 2019-01-12 RX ORDER — PROMETHAZINE HYDROCHLORIDE 25 MG/1
25 TABLET ORAL
Qty: 12 TAB | Refills: 0 | Status: SHIPPED | OUTPATIENT
Start: 2019-01-12 | End: 2019-11-14

## 2019-01-12 RX ORDER — POTASSIUM CHLORIDE 20 MEQ/1
40 TABLET, EXTENDED RELEASE ORAL
Status: COMPLETED | OUTPATIENT
Start: 2019-01-12 | End: 2019-01-12

## 2019-01-12 RX ORDER — ONDANSETRON 2 MG/ML
4 INJECTION INTRAMUSCULAR; INTRAVENOUS
Status: COMPLETED | OUTPATIENT
Start: 2019-01-12 | End: 2019-01-12

## 2019-01-12 RX ADMIN — SODIUM CHLORIDE 1000 ML: 900 INJECTION, SOLUTION INTRAVENOUS at 08:05

## 2019-01-12 RX ADMIN — IOPAMIDOL 100 ML: 612 INJECTION, SOLUTION INTRAVENOUS at 09:34

## 2019-01-12 RX ADMIN — ONDANSETRON 4 MG: 2 INJECTION INTRAMUSCULAR; INTRAVENOUS at 08:05

## 2019-01-12 RX ADMIN — PROMETHAZINE HYDROCHLORIDE 25 MG: 25 INJECTION INTRAMUSCULAR; INTRAVENOUS at 08:45

## 2019-01-12 RX ADMIN — POTASSIUM CHLORIDE 40 MEQ: 20 TABLET, EXTENDED RELEASE ORAL at 11:26

## 2019-01-12 RX ADMIN — MORPHINE SULFATE 4 MG: 4 INJECTION INTRAVENOUS at 08:43

## 2019-01-12 NOTE — LETTER
[address] [place] [address] Dr [facility] EMERGENCY DEPT 
[address] [facility] [address] 
[phone] Work/School Note Date: 1/12/2019 To Whom It May concern: Дмитрий Cm was seen and treated today in the emergency room by the following provider(s): 
Attending Provider: Frantz Gleason MD 
Physician Assistant: Carlotta Garcia, Yesi Villanueva. Дмитрий Cm may return to work on 1/14/19 Sincerely, Caterina Daniel, Yesi Villanueva

## 2019-01-12 NOTE — ED PROVIDER NOTES
EMERGENCY DEPARTMENT HISTORY AND PHYSICAL EXAM 
 
Date: 1/12/2019 Patient Name: Дмитрий Cm History of Presenting Illness Chief Complaint Patient presents with  Abdominal Pain History Provided By: Patient Chief Complaint: Abdominal pain, Nausea and vomiting. Duration: 2.5 days Timing:  Acute and Worsening Location: diffuse abdomen Quality: Burning, Pressure and Tightness Severity: 10 out of 10 Modifying Factors: pain is worse when vomiting Associated Symptoms: none Additional History (Context): Дмитрий Cm is a 26 y.o. male with a history of gastroenteritis who presents with a 2.5 day history of abdominal pain and n/v since eating [employer] food. Reports no one else at the same food as him. He has tried pepto and zantac at home without relief. Denies fevers or chill. Denies history of abdominal surgeries in the past. Denies any penile discharge or testicular concerns. Denies diarrhea or constipation. Pt denies any fevers or chills, headache, dizziness or light headedness, ENT issues, CP or discomfort, SOB, cough, back pain, diaphoresis, melena/hematochezia, dysuria, hematuria, frequency, focal weakness/numbness/tingling, or rash. Patient has no other complaints at this time. PCP: None Current Outpatient Medications Medication Sig Dispense Refill  promethazine (PHENERGAN) 25 mg tablet Take 1 Tab by mouth every six (6) hours as needed. 12 Tab 0  
 potassium chloride (KLOR-CON) 25 mEq pack Take 1 Packet by mouth two (2) times daily (with meals) for 2 days. 4 Packet 0  
 ondansetron hcl (ZOFRAN) 4 mg tablet Take 1 Tab by mouth every eight (8) hours as needed for Nausea. 12 Tab 0  
 ketorolac (TORADOL) 10 mg tablet Take 1 Tab by mouth every eight (8) hours as needed for Pain. 14 Tab 0 Past History Past Medical History: 
Past Medical History:  
Diagnosis Date  Gastroenteritis Past Surgical History: No past surgical history on file. Family History: No family history on file. Social History: 
Social History Tobacco Use  Smoking status: Never Smoker Substance Use Topics  Alcohol use: No  
 Drug use: Yes Types: Marijuana Comment: States daily marijuana use. Allergies: Allergies Allergen Reactions  Ibuprofen Hives Review of Systems Review of Systems Constitutional: Negative for chills and fever. HENT: Negative for congestion, rhinorrhea and sore throat. Respiratory: Negative for cough and shortness of breath. Cardiovascular: Negative for chest pain. Gastrointestinal: Positive for abdominal pain, nausea and vomiting. Negative for blood in stool, constipation and diarrhea. Genitourinary: Negative for dysuria, frequency and hematuria. Musculoskeletal: Negative for back pain and myalgias. Skin: Negative for rash and wound. Neurological: Negative for dizziness and headaches. All other systems reviewed and are negative. All Other Systems Negative Physical Exam  
 
Vitals:  
 01/12/19 0739 01/12/19 0909 01/12/19 0915 01/12/19 1126 BP: 129/86 (!) 136/93  (!) 153/93 Pulse: 73   60 Resp: 17   16 Temp: 98.1 °F (36.7 °C)   98 °F (36.7 °C) SpO2: 100%  100% 100% Weight: 72.6 kg (160 lb) Height: 6' 1\" (1.854 m) Physical Exam  
Constitutional: He is oriented to person, place, and time. He appears well-developed and well-nourished. He appears distressed. HENT:  
Head: Normocephalic and atraumatic. Mouth/Throat: Uvula is midline. Mucous membranes are dry. No trismus in the jaw. No uvula swelling. No oropharyngeal exudate, posterior oropharyngeal edema, posterior oropharyngeal erythema or tonsillar abscesses. Eyes: Conjunctivae are normal.  
Neck: Normal range of motion. Neck supple. Cardiovascular: Normal rate, regular rhythm and normal heart sounds.   
Pulmonary/Chest: Effort normal and breath sounds normal. No respiratory distress. He exhibits no tenderness. Abdominal: Soft. Normal appearance and bowel sounds are normal. He exhibits no distension. There is no tenderness. There is no rigidity, no rebound, no guarding, no CVA tenderness, no tenderness at McBurney's point and negative Finch's sign. Musculoskeletal: Normal range of motion. He exhibits no edema or deformity. Neurological: He is alert and oriented to person, place, and time. Skin: Skin is warm and dry. He is not diaphoretic. Psychiatric: He has a normal mood and affect. His behavior is normal.  
Nursing note and vitals reviewed. Diagnostic Study Results Labs - Recent Results (from the past 12 hour(s)) CBC WITH AUTOMATED DIFF Collection Time: 01/12/19  7:50 AM  
Result Value Ref Range WBC 7.5 4.6 - 13.2 K/uL  
 RBC 4.65 (L) 4.70 - 5.50 M/uL  
 HGB 15.2 13.0 - 16.0 g/dL HCT 42.5 36.0 - 48.0 % MCV 91.4 74.0 - 97.0 FL  
 MCH 32.7 24.0 - 34.0 PG  
 MCHC 35.8 31.0 - 37.0 g/dL  
 RDW 13.2 11.6 - 14.5 % PLATELET 038 410 - 314 K/uL MPV 10.4 9.2 - 11.8 FL  
 NEUTROPHILS 56 40 - 73 % LYMPHOCYTES 36 21 - 52 % MONOCYTES 8 3 - 10 % EOSINOPHILS 0 0 - 5 % BASOPHILS 0 0 - 2 %  
 ABS. NEUTROPHILS 4.2 1.8 - 8.0 K/UL  
 ABS. LYMPHOCYTES 2.7 0.9 - 3.6 K/UL  
 ABS. MONOCYTES 0.6 0.05 - 1.2 K/UL  
 ABS. EOSINOPHILS 0.0 0.0 - 0.4 K/UL  
 ABS. BASOPHILS 0.0 0.0 - 0.1 K/UL  
 DF AUTOMATED METABOLIC PANEL, COMPREHENSIVE Collection Time: 01/12/19  7:50 AM  
Result Value Ref Range Sodium 136 136 - 145 mmol/L Potassium 3.2 (L) 3.5 - 5.5 mmol/L Chloride 101 100 - 108 mmol/L  
 CO2 23 21 - 32 mmol/L Anion gap 12 3.0 - 18 mmol/L Glucose 127 (H) 74 - 99 mg/dL BUN 21 (H) 7.0 - 18 MG/DL Creatinine 1.45 (H) 0.6 - 1.3 MG/DL  
 BUN/Creatinine ratio 14 12 - 20 GFR est AA >60 >60 ml/min/1.73m2 GFR est non-AA 55 (L) >60 ml/min/1.73m2 Calcium 9.3 8.5 - 10.1 MG/DL  Bilirubin, total 1.3 (H) 0.2 - 1.0 MG/DL  
 ALT (SGPT) 24 16 - 61 U/L  
 AST (SGOT) 18 15 - 37 U/L Alk. phosphatase 65 45 - 117 U/L Protein, total 9.0 (H) 6.4 - 8.2 g/dL Albumin 4.4 3.4 - 5.0 g/dL Globulin 4.6 (H) 2.0 - 4.0 g/dL A-G Ratio 1.0 0.8 - 1.7 LIPASE Collection Time: 01/12/19  7:50 AM  
Result Value Ref Range Lipase 98 73 - 393 U/L  
URINALYSIS W/ RFLX MICROSCOPIC Collection Time: 01/12/19 11:20 AM  
Result Value Ref Range Color YELLOW Appearance CLEAR Specific gravity >1.030 (H) 1.005 - 1.030  
 pH (UA) 7.0 5.0 - 8.0 Protein NEGATIVE  NEG mg/dL Glucose NEGATIVE  NEG mg/dL Ketone 15 (A) NEG mg/dL Bilirubin NEGATIVE  NEG Blood NEGATIVE  NEG Urobilinogen 2.0 (H) 0.2 - 1.0 EU/dL Nitrites NEGATIVE  NEG Leukocyte Esterase NEGATIVE  NEG Radiologic Studies -  
CT ABD PELV W CONT Final Result IMPRESSION:  
  
1. No acute findings. CT Results  (Last 48 hours) 01/12/19 0938  CT ABD PELV W CONT Final result Impression:  IMPRESSION:  
   
1. No acute findings. Narrative: EXAM: CT ABD PELV W CONT  
   
CLINICAL INDICATION/HISTORY : Abdominal pain. Nausea vomiting and abdominal  
pain for 2 days COMPARISON: None TECHNIQUE: Helical scan of the abdomen and pelvis was obtained  from the  
diaphragm to the symphysis without oral and with uneventful IV contrast  
administration. 100cc Isovue-300 All CT scans at this facility are performed using dose optimization technique as  
appropriate to a performed exam, to include automated exposure control,  
adjustment of the MA and/or kV according to patient size (including appropriate  
matching for site specific examinations), or use of iterative reconstruction  
technique. FINDINGS:   
Lung Bases:  Free of active disease Liver: Unremarkable Gallbladder:  Unremarkable Spleen: Unremarkable Adrenals:  Unremarkable Pancreas: Unremarkable Kidneys: 1.9 cm cyst upper pole right kidney. 7 mm probable cyst mid left kidney Stomach, small bowel, colon:  Unremarkable. Fluid-filled structure in the right  
pelvis appears to be the cecum. The appendix appears to be normal and best  
identified on the coronal images. Lymph nodes:  No adenopathy Vasculature/Aorta:  Lindaann Oas Peritoneal spaces:  No ascites or free air Bladder:  Bladder is not well distended and not well evaluated Pelvic structures: No evidence for mass. Bones:  Unremarkable for age CXR Results  (Last 48 hours) None Medical Decision Making I am the first provider for this patient. I reviewed the vital signs, available nursing notes, past medical history, past surgical history, family history and social history. Vital Signs-Reviewed the patient's vital signs. Pulse Oximetry Analysis -  100 % RA Records Reviewed: Nursing Notes and Old Medical Records Procedures: None Procedures Provider Notes (Medical Decision Making):  
 
Differential Diagnosis:  appendicitis, constipation, gastritis/gastroenteritis, UTI/cystitis, IBS, IBD, celiac disease, pancreatitis, PUD, cholangitis, gastroparesis Plan: Will order CT of abd and pelvis given guarding on exam, will order additional antinausea medication, pain meds, labs and ua. 11:16 AM 
Have shared reassuring CT results, discussed dehydration and hypokalemia, will give dose of potassium here, pending UA at this time and then will discharge home. 11:51 AM 
Have shared reassuring workup with patient and family at bedside, will discharge home with antinausea medication and 2 day supply of potassium. Advised bland diet. Patient agrees with the plan and management and states all questions have been thoroughly answered and there are no more remaining questions.   
 
 
MED RECONCILIATION: 
 No current facility-administered medications for this encounter. Current Outpatient Medications Medication Sig  promethazine (PHENERGAN) 25 mg tablet Take 1 Tab by mouth every six (6) hours as needed.  potassium chloride (KLOR-CON) 25 mEq pack Take 1 Packet by mouth two (2) times daily (with meals) for 2 days.  ondansetron hcl (ZOFRAN) 4 mg tablet Take 1 Tab by mouth every eight (8) hours as needed for Nausea.  ketorolac (TORADOL) 10 mg tablet Take 1 Tab by mouth every eight (8) hours as needed for Pain. Disposition: 
Home DISCHARGE NOTE:  
Pt has been reexamined. Patient has no new complaints, changes, or physical findings. Care plan outlined and precautions discussed. Results of workup were reviewed with the patient. All medications were reviewed with the patient. All of pt's questions and concerns were addressed. Patient was instructed and agrees to follow up with PCP, as well as to return to the ED upon further deterioration. Patient is ready to go home. Follow-up Information Follow up With Specialties Details Why Contact Info [facility] EMERGENCY DEPT Emergency Medicine  As needed [address] [address] [id number] 
[phone] [address] [place] [address] Schedule an appointment as soon as possible for a visit  [address] [place] [id number] 
[phone] Current Discharge Medication List  
  
START taking these medications Details  
promethazine (PHENERGAN) 25 mg tablet Take 1 Tab by mouth every six (6) hours as needed. Qty: 12 Tab, Refills: 0  
  
potassium chloride (KLOR-CON) 25 mEq pack Take 1 Packet by mouth two (2) times daily (with meals) for 2 days. Qty: 4 Packet, Refills: 0 Diagnosis Clinical Impression: 1. Abdominal pain, generalized 2. Non-intractable vomiting with nausea, unspecified vomiting type 3. Hypokalemia

## 2019-01-12 NOTE — ED TRIAGE NOTES
Patient reports nausea, vomiting, and 10/10 \"stomach\" pain for approximately two days that has become progressively worse. Actively vomiting upon arrival. EMS gave no medications.

## 2019-01-12 NOTE — DISCHARGE INSTRUCTIONS
Patient Education        Abdominal Pain: Care Instructions  Your Care Instructions    Abdominal pain has many possible causes. Some aren't serious and get better on their own in a few days. Others need more testing and treatment. If your pain continues or gets worse, you need to be rechecked and may need more tests to find out what is wrong. You may need surgery to correct the problem. Don't ignore new symptoms, such as fever, nausea and vomiting, urination problems, pain that gets worse, and dizziness. These may be signs of a more serious problem. Your doctor may have recommended a follow-up visit in the next 8 to 12 hours. If you are not getting better, you may need more tests or treatment. The doctor has checked you carefully, but problems can develop later. If you notice any problems or new symptoms, get medical treatment right away. Follow-up care is a key part of your treatment and safety. Be sure to make and go to all appointments, and call your doctor if you are having problems. It's also a good idea to know your test results and keep a list of the medicines you take. How can you care for yourself at home? · Rest until you feel better. · To prevent dehydration, drink plenty of fluids, enough so that your urine is light yellow or clear like water. Choose water and other caffeine-free clear liquids until you feel better. If you have kidney, heart, or liver disease and have to limit fluids, talk with your doctor before you increase the amount of fluids you drink. · If your stomach is upset, eat mild foods, such as rice, dry toast or crackers, bananas, and applesauce. Try eating several small meals instead of two or three large ones. · Wait until 48 hours after all symptoms have gone away before you have spicy foods, alcohol, and drinks that contain caffeine. · Do not eat foods that are high in fat. · Avoid anti-inflammatory medicines such as aspirin, ibuprofen (Advil, Motrin), and naproxen (Aleve). These can cause stomach upset. Talk to your doctor if you take daily aspirin for another health problem. When should you call for help? Call 911 anytime you think you may need emergency care. For example, call if:    · You passed out (lost consciousness).     · You pass maroon or very bloody stools.     · You vomit blood or what looks like coffee grounds.     · You have new, severe belly pain.    Call your doctor now or seek immediate medical care if:    · Your pain gets worse, especially if it becomes focused in one area of your belly.     · You have a new or higher fever.     · Your stools are black and look like tar, or they have streaks of blood.     · You have unexpected vaginal bleeding.     · You have symptoms of a urinary tract infection. These may include:  ? Pain when you urinate. ? Urinating more often than usual.  ? Blood in your urine.     · You are dizzy or lightheaded, or you feel like you may faint.    Watch closely for changes in your health, and be sure to contact your doctor if:    · You are not getting better after 1 day (24 hours). Where can you learn more? Go to http://cayla[employer]kathy.info/. Enter R610 in the search box to learn more about \"Abdominal Pain: Care Instructions. \"  Current as of: November 20, 2017  Content Version: 11.8  © 1727-7074 [employer]. Care instructions adapted under license by [email or web address] (which disclaims liability or warranty for this information). If you have questions about a medical condition or this instruction, always ask your healthcare professional. [place] [address] any warranty or liability for your use of this information. Patient Education        Nausea and Vomiting: Care Instructions  Your Care Instructions    When you are nauseated, you may feel weak and sweaty and notice a lot of saliva in your mouth. Nausea often leads to vomiting.  Most of the time you do not need to worry about nausea and vomiting, but they can be signs of other illnesses. Two common causes of nausea and vomiting are stomach flu and food poisoning. Nausea and vomiting from viral stomach flu will usually start to improve within 24 hours. Nausea and vomiting from food poisoning may last from 12 to 48 hours. The doctor has checked you carefully, but problems can develop later. If you notice any problems or new symptoms, get medical treatment right away. Follow-up care is a key part of your treatment and safety. Be sure to make and go to all appointments, and call your doctor if you are having problems. It's also a good idea to know your test results and keep a list of the medicines you take. How can you care for yourself at home? · To prevent dehydration, drink plenty of fluids, enough so that your urine is light yellow or clear like water. Choose water and other caffeine-free clear liquids until you feel better. If you have kidney, heart, or liver disease and have to limit fluids, talk with your doctor before you increase the amount of fluids you drink. · Rest in bed until you feel better. · When you are able to eat, try clear soups, mild foods, and liquids until all symptoms are gone for 12 to 48 hours. Other good choices include dry toast, crackers, cooked cereal, and gelatin dessert, such as Jell-O. When should you call for help? Call 911 anytime you think you may need emergency care. For example, call if:    · You passed out (lost consciousness).    Call your doctor now or seek immediate medical care if:    · You have symptoms of dehydration, such as:  ? Dry eyes and a dry mouth. ? Passing only a little dark urine. ?  Feeling thirstier than usual.     · You have new or worsening belly pain.     · You have a new or higher fever.     · You vomit blood or what looks like coffee grounds.    Watch closely for changes in your health, and be sure to contact your doctor if:    · You have ongoing nausea and vomiting.     · Your vomiting is getting worse.     · Your vomiting lasts longer than 2 days.     · You are not getting better as expected. Where can you learn more? Go to http://cayla-kathy.info/. Enter 25 [id number] in the search box to learn more about \"Nausea and Vomiting: Care Instructions. \"  Current as of: November 20, 2017  Content Version: 11.8  © 1879-1525 [employer] Care instructions adapted under license by [employer] (which disclaims liability or warranty for this information). If you have questions about a medical condition or this instruction, always ask your healthcare professional. [place] [address] any warranty or liability for your use of this information.

## 2019-11-14 ENCOUNTER — HOSPITAL ENCOUNTER (EMERGENCY)
Age: 34
Discharge: HOME | End: 2019-11-14
Attending: EMERGENCY MEDICINE
Payer: SELF-PAY

## 2019-11-14 VITALS
DIASTOLIC BLOOD PRESSURE: 76 MMHG | OXYGEN SATURATION: 99 % | BODY MASS INDEX: 22.53 KG/M2 | SYSTOLIC BLOOD PRESSURE: 114 MMHG | RESPIRATION RATE: 16 BRPM | WEIGHT: 170 LBS | HEIGHT: 73 IN | HEART RATE: 82 BPM | TEMPERATURE: 97.8 F

## 2019-11-14 DIAGNOSIS — M25.511 NONTRAUMATIC SHOULDER PAIN, RIGHT: Primary | ICD-10-CM

## 2019-11-14 PROCEDURE — 99282 EMERGENCY DEPT VISIT SF MDM: CPT

## 2019-11-14 NOTE — DISCHARGE INSTRUCTIONS
Patient Education     Musculoskeletal Pain: Care Instructions  Your Care Instructions  Different problems with the bones, muscles, nerves, ligaments, and tendons in the body can cause pain. One or more areas of your body may ache or burn. Or they may feel tired, stiff, or sore. The medical term for this type of pain is musculoskeletal pain. It can have many different causes. Sometimes the pain is caused by an injury such as a strain or sprain. Or you might have pain from using one part of your body in the same way over and over again. This is called overuse. In some cases, the cause of the pain is another health problem such as arthritis or fibromyalgia. The doctor will examine you and ask you questions about your health to help find the cause of your pain. Blood tests or imaging tests like an X-ray may also be helpful. But sometimes doctors can't find a cause of the pain. Treatment depends on your symptoms and the cause of the pain, if known. The doctor has checked you carefully, but problems can develop later. If you notice any problems or new symptoms, get medical treatment right away. Follow-up care is a key part of your treatment and safety. Be sure to make and go to all appointments, and call your doctor if you are having problems. It's also a good idea to know your test results and keep a list of the medicines you take. How can you care for yourself at home? · Rest until you feel better. · Do not do anything that makes the pain worse. Return to exercise gradually if you feel better and your doctor says it's okay. · Be safe with medicines. Read and follow all instructions on the label. ¨ If the doctor gave you a prescription medicine for pain, take it as prescribed. ¨ If you are not taking a prescription pain medicine, ask your doctor if you can take an over-the-counter medicine. · Put ice or a cold pack on the area for 10 to 20 minutes at a time to ease pain.  Put a thin cloth between the ice and your skin. When should you call for help? Call your doctor now or seek immediate medical care if:  · You have new pain, or your pain gets worse. · You have new symptoms such as a fever, a rash, or chills. Watch closely for changes in your health, and be sure to contact your doctor if:  · You do not get better as expected. Where can you learn more? Go to "[employer].be  Enter Q624 in the search box to learn more about \"Musculoskeletal Pain: Care Instructions. \"   © 5481-5206 [employer]. Care instructions adapted under license by [facility] (which disclaims liability or warranty for this information). This care instruction is for use with your licensed healthcare professional. If you have questions about a medical condition or this instruction, always ask your healthcare professional. [address] any warranty or liability for your use of this information.   Content Version: 82.4.903149; Current as of: November 20, 2015

## 2019-11-14 NOTE — ED NOTES
Discharge instructions reviewed with patient. Patient voices understanding. Patient advised to follow up as directed on discharge instructions. Patient denies questions, needs or concerns at discharge regarding discharge instructions. Patient voiced understanding. No s/sx of distress noted.  Patient received discharge instructions from Clayton Senior, [address] [place] [address]
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
## 2019-11-24 ENCOUNTER — HOSPITAL ENCOUNTER (EMERGENCY)
Age: 34
Discharge: HOME | End: 2019-11-24
Attending: EMERGENCY MEDICINE
Payer: SELF-PAY

## 2019-11-24 ENCOUNTER — HOSPITAL ENCOUNTER (EMERGENCY)
Dept: ULTRASOUND IMAGING | Age: 34
Discharge: HOME | End: 2019-11-24
Attending: EMERGENCY MEDICINE
Payer: SELF-PAY

## 2019-11-24 VITALS
RESPIRATION RATE: 18 BRPM | SYSTOLIC BLOOD PRESSURE: 115 MMHG | DIASTOLIC BLOOD PRESSURE: 78 MMHG | HEART RATE: 63 BPM | TEMPERATURE: 98.4 F | OXYGEN SATURATION: 100 %

## 2019-11-24 DIAGNOSIS — N45.1 EPIDIDYMITIS: ICD-10-CM

## 2019-11-24 DIAGNOSIS — N50.819 TESTICULAR DISCOMFORT: ICD-10-CM

## 2019-11-24 DIAGNOSIS — R10.30 LOWER ABDOMINAL PAIN: Primary | ICD-10-CM

## 2019-11-24 LAB
ALBUMIN SERPL-MCNC: 4.4 G/DL (ref 3.4–5)
ALBUMIN/GLOB SERPL: 1 {RATIO} (ref 0.8–1.7)
ALP SERPL-CCNC: 98 U/L (ref 45–117)
ALT SERPL-CCNC: 19 U/L (ref 16–61)
ANION GAP SERPL CALC-SCNC: 4 MMOL/L (ref 3–18)
APPEARANCE UR: CLEAR
AST SERPL-CCNC: 14 U/L (ref 10–38)
BACTERIA URNS QL MICRO: ABNORMAL /HPF
BASOPHILS # BLD: 0 K/UL (ref 0–0.1)
BASOPHILS NFR BLD: 0 % (ref 0–2)
BILIRUB SERPL-MCNC: 0.3 MG/DL (ref 0.2–1)
BILIRUB UR QL: NEGATIVE
BUN SERPL-MCNC: 13 MG/DL (ref 7–18)
BUN/CREAT SERPL: 11 (ref 12–20)
CALCIUM SERPL-MCNC: 8.9 MG/DL (ref 8.5–10.1)
CHLORIDE SERPL-SCNC: 106 MMOL/L (ref 100–111)
CO2 SERPL-SCNC: 28 MMOL/L (ref 21–32)
COLOR UR: YELLOW
CREAT SERPL-MCNC: 1.19 MG/DL (ref 0.6–1.3)
DIFFERENTIAL METHOD BLD: ABNORMAL
EOSINOPHIL # BLD: 0.1 K/UL (ref 0–0.4)
EOSINOPHIL NFR BLD: 1 % (ref 0–5)
EPITH CASTS URNS QL MICRO: ABNORMAL /LPF (ref 0–5)
ERYTHROCYTE [DISTWIDTH] IN BLOOD BY AUTOMATED COUNT: 13 % (ref 11.6–14.5)
GLOBULIN SER CALC-MCNC: 4.3 G/DL (ref 2–4)
GLUCOSE SERPL-MCNC: 85 MG/DL (ref 74–99)
GLUCOSE UR STRIP.AUTO-MCNC: NEGATIVE MG/DL
HCT VFR BLD AUTO: 40 % (ref 36–48)
HGB BLD-MCNC: 13.8 G/DL (ref 13–16)
HGB UR QL STRIP: NEGATIVE
KETONES UR QL STRIP.AUTO: NEGATIVE MG/DL
LEUKOCYTE ESTERASE UR QL STRIP.AUTO: ABNORMAL
LIPASE SERPL-CCNC: 126 U/L (ref 73–393)
LYMPHOCYTES # BLD: 3.4 K/UL (ref 0.9–3.6)
LYMPHOCYTES NFR BLD: 61 % (ref 21–52)
MCH RBC QN AUTO: 32.1 PG (ref 24–34)
MCHC RBC AUTO-ENTMCNC: 34.5 G/DL (ref 31–37)
MCV RBC AUTO: 93 FL (ref 74–97)
MONOCYTES # BLD: 0.2 K/UL (ref 0.05–1.2)
MONOCYTES NFR BLD: 4 % (ref 3–10)
NEUTS SEG # BLD: 1.9 K/UL (ref 1.8–8)
NEUTS SEG NFR BLD: 34 % (ref 40–73)
NITRITE UR QL STRIP.AUTO: NEGATIVE
PH UR STRIP: 6.5 [PH] (ref 5–8)
PLATELET # BLD AUTO: 210 K/UL (ref 135–420)
PMV BLD AUTO: 10 FL (ref 9.2–11.8)
POTASSIUM SERPL-SCNC: 3.7 MMOL/L (ref 3.5–5.5)
PROT SERPL-MCNC: 8.7 G/DL (ref 6.4–8.2)
PROT UR STRIP-MCNC: NEGATIVE MG/DL
RBC # BLD AUTO: 4.3 M/UL (ref 4.7–5.5)
RBC #/AREA URNS HPF: ABNORMAL /HPF (ref 0–5)
SODIUM SERPL-SCNC: 138 MMOL/L (ref 136–145)
SP GR UR REFRACTOMETRY: 1.02 (ref 1–1.03)
UROBILINOGEN UR QL STRIP.AUTO: 1 EU/DL (ref 0.2–1)
WBC # BLD AUTO: 5.7 K/UL (ref 4.6–13.2)
WBC URNS QL MICRO: ABNORMAL /HPF (ref 0–4)

## 2019-11-24 PROCEDURE — 83690 ASSAY OF LIPASE: CPT

## 2019-11-24 PROCEDURE — 87491 CHLMYD TRACH DNA AMP PROBE: CPT

## 2019-11-24 PROCEDURE — 96372 THER/PROPH/DIAG INJ SC/IM: CPT

## 2019-11-24 PROCEDURE — 80053 COMPREHEN METABOLIC PANEL: CPT

## 2019-11-24 PROCEDURE — 81001 URINALYSIS AUTO W/SCOPE: CPT

## 2019-11-24 PROCEDURE — 74011250637 HC RX REV CODE- 250/637: Performed by: EMERGENCY MEDICINE

## 2019-11-24 PROCEDURE — 85025 COMPLETE CBC W/AUTO DIFF WBC: CPT

## 2019-11-24 PROCEDURE — 74011250636 HC RX REV CODE- 250/636: Performed by: EMERGENCY MEDICINE

## 2019-11-24 PROCEDURE — 93975 VASCULAR STUDY: CPT

## 2019-11-24 PROCEDURE — 99283 EMERGENCY DEPT VISIT LOW MDM: CPT

## 2019-11-24 RX ORDER — AZITHROMYCIN 250 MG/1
1000 TABLET, FILM COATED ORAL
Status: COMPLETED | OUTPATIENT
Start: 2019-11-24 | End: 2019-11-24

## 2019-11-24 RX ORDER — CEFTRIAXONE 250 MG/8ML
250 INJECTION, POWDER, FOR SOLUTION INTRAMUSCULAR; INTRAVENOUS
Status: COMPLETED | OUTPATIENT
Start: 2019-11-24 | End: 2019-11-24

## 2019-11-24 RX ADMIN — CEFTRIAXONE SODIUM 250 MG: 250 INJECTION, POWDER, FOR SOLUTION INTRAMUSCULAR; INTRAVENOUS at 05:48

## 2019-11-24 RX ADMIN — AZITHROMYCIN MONOHYDRATE 1000 MG: 250 TABLET ORAL at 05:48

## 2019-11-24 NOTE — ED PROVIDER NOTES
Tessa Jones is a 26 y.o. male with a past medical history of gastroenteritis coming in with lower abdominal pain. Patient states that earlier tonight he started having some achy pain in his mid lower abdomen radiating into his testicles. He states that testicular pain is worse on the left. Patient states that he has not had any dysuria or urethral discharge. He states his been having normal bowel movements without diarrhea or constipation. Denies any nausea vomiting. Denies any fevers or chills. Patient denies any rectal pain or pain with bowel movements. Patient states that he is sexually active and was told while ago from a sexual partner that she had trichomonas. He states he was tested for trichomonas and was told it was negative at the clinic. He was never put on any antibiotics. Patient has no other complaints at this time. Past Medical History:   Diagnosis Date    Abdominal pain     Gastroenteritis     H/O emphysema     Hypokalemia     Syncope and collapse        No past surgical history on file. No family history on file. Social History     Socioeconomic History    Marital status:      Spouse name: Not on file    Number of children: Not on file    Years of education: Not on file    Highest education level: Not on file   Occupational History    Not on file   Social Needs    Financial resource strain: Not on file    Food insecurity:     Worry: Not on file     Inability: Not on file    Transportation needs:     Medical: Not on file     Non-medical: Not on file   Tobacco Use    Smoking status: Never Smoker    Smokeless tobacco: Never Used   Substance and Sexual Activity    Alcohol use: No    Drug use: Yes     Types: Marijuana     Comment: States daily marijuana use.      Sexual activity: Not on file   Lifestyle    Physical activity:     Days per week: Not on file     Minutes per session: Not on file    Stress: Not on file   Relationships    Social connections:     Talks on phone: Not on file     Gets together: Not on file     Attends Hinduism service: Not on file     Active member of club or organization: Not on file     Attends meetings of clubs or organizations: Not on file     Relationship status: Not on file    Intimate partner violence:     Fear of current or ex partner: Not on file     Emotionally abused: Not on file     Physically abused: Not on file     Forced sexual activity: Not on file   Other Topics Concern    Not on file   Social History Narrative    Not on file         ALLERGIES: Ibuprofen    Review of Systems   Constitutional: Negative. Negative for chills and fever. HENT: Negative. Eyes: Negative. Respiratory: Negative. Negative for shortness of breath. Cardiovascular: Negative. Negative for chest pain. Gastrointestinal: Positive for abdominal pain. Negative for constipation, diarrhea, nausea and vomiting. Genitourinary: Positive for testicular pain. Negative for discharge, dysuria, flank pain, frequency, genital sores, penile pain, scrotal swelling and urgency. Musculoskeletal: Negative. Negative for myalgias. Skin: Negative. Negative for rash. Neurological: Negative. Negative for dizziness, weakness and light-headedness. Psychiatric/Behavioral: Negative. All other systems reviewed and are negative. Vitals:    11/24/19 0038   BP: 115/78   Pulse: 63   Resp: 18   Temp: 98.4 °F (36.9 °C)   SpO2: 100%            Physical Exam  Vitals signs reviewed. Constitutional:       General: He is not in acute distress. Appearance: Normal appearance. He is well-developed. HENT:      Head: Normocephalic and atraumatic. Eyes:      Extraocular Movements: Extraocular movements intact. Conjunctiva/sclera: Conjunctivae normal.      Pupils: Pupils are equal, round, and reactive to light. Neck:      Musculoskeletal: Normal range of motion and neck supple. Thyroid: No thyromegaly. Vascular: No JVD. Cardiovascular:      Rate and Rhythm: Normal rate and regular rhythm. Heart sounds: S1 normal and S2 normal.   Pulmonary:      Effort: Pulmonary effort is normal. No accessory muscle usage or respiratory distress. Abdominal:      General: There is no distension. Palpations: Abdomen is soft. Abdomen is not rigid. Tenderness: There is no tenderness. There is no right CVA tenderness, left CVA tenderness, guarding or rebound. Genitourinary:     Scrotum/Testes: Cremasteric reflex is present. Right: Tenderness present. Mass or swelling not present. Left: Tenderness present. Mass or swelling not present. Musculoskeletal: Normal range of motion. General: No tenderness. Skin:     General: Skin is warm. Findings: No rash. Neurological:      General: No focal deficit present. Mental Status: He is alert and oriented to person, place, and time. Cranial Nerves: No cranial nerve deficit. Sensory: No sensory deficit. Coordination: Coordination normal.   Psychiatric:         Speech: Speech normal.         Behavior: Behavior normal.          MDM  Number of Diagnoses or Management Options  Epididymitis:   Lower abdominal pain:   Testicular discomfort:   Diagnosis management comments: Arabella Ruano is a 26 y.o. male coming in with lower abdominal pain and testicular discomfort for 1 day. Patient is afebrile and well-appearing. Patient states he was sexually active with a partner recently was diagnosed with trichomonas. He states he was tested, but never treated. Patient has a normal cremasteric reflex, very low suspicion of testicular torsion. Possible urethritis versus cystitis versus epididymitis. Will get basic labs, urine, GC and chlamydia, and testicular ultrasound. UA unremarkable. Patient's testicular ultrasound shows no evidence of torsion, but prominent left epididymis.   Patient likely has mild left epididymitis given recent concern for STI, likely gonorrhea or chlamydia. Patient will be treated with azithromycin and Rocephin. Patient counseled to follow-up with the health department for further testing including HIV testing. He was given return precautions for worsening symptoms and counseled on symptomatic treatment at home. Patient verbalized understanding of all his discharge and follow-up instructions and agrees with this plan.          Procedures      Vitals:  Patient Vitals for the past 12 hrs:   Temp Pulse Resp BP SpO2   11/24/19 0038 98.4 °F (36.9 °C) 63 18 115/78 100 %       Medications ordered:   Medications   cefTRIAXone (ROCEPHIN) injection 250 mg (has no administration in time range)   azithromycin (ZITHROMAX) tablet 1,000 mg (has no administration in time range)         Lab findings:  Recent Results (from the past 12 hour(s))   URINALYSIS W/ RFLX MICROSCOPIC    Collection Time: 11/24/19  1:01 AM   Result Value Ref Range    Color YELLOW      Appearance CLEAR      Specific gravity 1.025 1.005 - 1.030      pH (UA) 6.5 5.0 - 8.0      Protein NEGATIVE  NEG mg/dL    Glucose NEGATIVE  NEG mg/dL    Ketone NEGATIVE  NEG mg/dL    Bilirubin NEGATIVE  NEG      Blood NEGATIVE  NEG      Urobilinogen 1.0 0.2 - 1.0 EU/dL    Nitrites NEGATIVE  NEG      Leukocyte Esterase TRACE (A) NEG     URINE MICROSCOPIC ONLY    Collection Time: 11/24/19  1:01 AM   Result Value Ref Range    WBC 1 to 4 0 - 4 /hpf    RBC 0 to 2 0 - 5 /hpf    Epithelial cells FEW 0 - 5 /lpf    Bacteria FEW (A) NEG /hpf   METABOLIC PANEL, COMPREHENSIVE    Collection Time: 11/24/19  1:05 AM   Result Value Ref Range    Sodium 138 136 - 145 mmol/L    Potassium 3.7 3.5 - 5.5 mmol/L    Chloride 106 100 - 111 mmol/L    CO2 28 21 - 32 mmol/L    Anion gap 4 3.0 - 18 mmol/L    Glucose 85 74 - 99 mg/dL    BUN 13 7.0 - 18 MG/DL    Creatinine 1.19 0.6 - 1.3 MG/DL    BUN/Creatinine ratio 11 (L) 12 - 20      GFR est AA >60 >60 ml/min/1.73m2    GFR est non-AA >60 >60 ml/min/1.73m2    Calcium 8.9 8.5 - 10.1 MG/DL    Bilirubin, total 0.3 0.2 - 1.0 MG/DL    ALT (SGPT) 19 16 - 61 U/L    AST (SGOT) 14 10 - 38 U/L    Alk. phosphatase 98 45 - 117 U/L    Protein, total 8.7 (H) 6.4 - 8.2 g/dL    Albumin 4.4 3.4 - 5.0 g/dL    Globulin 4.3 (H) 2.0 - 4.0 g/dL    A-G Ratio 1.0 0.8 - 1.7     CBC WITH AUTOMATED DIFF    Collection Time: 11/24/19  1:05 AM   Result Value Ref Range    WBC 5.7 4.6 - 13.2 K/uL    RBC 4.30 (L) 4.70 - 5.50 M/uL    HGB 13.8 13.0 - 16.0 g/dL    HCT 40.0 36.0 - 48.0 %    MCV 93.0 74.0 - 97.0 FL    MCH 32.1 24.0 - 34.0 PG    MCHC 34.5 31.0 - 37.0 g/dL    RDW 13.0 11.6 - 14.5 %    PLATELET 620 369 - 438 K/uL    MPV 10.0 9.2 - 11.8 FL    NEUTROPHILS 34 (L) 40 - 73 %    LYMPHOCYTES 61 (H) 21 - 52 %    MONOCYTES 4 3 - 10 %    EOSINOPHILS 1 0 - 5 %    BASOPHILS 0 0 - 2 %    ABS. NEUTROPHILS 1.9 1.8 - 8.0 K/UL    ABS. LYMPHOCYTES 3.4 0.9 - 3.6 K/UL    ABS. MONOCYTES 0.2 0.05 - 1.2 K/UL    ABS. EOSINOPHILS 0.1 0.0 - 0.4 K/UL    ABS. BASOPHILS 0.0 0.0 - 0.1 K/UL    DF AUTOMATED     LIPASE    Collection Time: 11/24/19  1:05 AM   Result Value Ref Range    Lipase 126 73 - 393 U/L       X-Ray, CT or other radiology findings or impressions:  US SCROTUM/TESTICLES W DOPPLER   Final Result   IMPRESSION:         1. No sonographic evidence for testicular torsion at the time of this exam.   2. Left epididymis appears mildly prominent, but without definite hyperemia. Early epididymitis not entirely excluded. 3. Bilateral hydroceles. Disposition:  Diagnosis:   1. Lower abdominal pain    2. Testicular discomfort    3. Epididymitis        Disposition: Discharge    Follow-up Information     Follow up With Specialties Details Why Contact Info    [address] to for office follow up Brendon Meneses7.   [address] [facility] Emergency Medicine  As needed, If symptoms worsen [address] [facility] [id number]  [phone]           Patient's Medications    No medications on file

## 2019-11-24 NOTE — DISCHARGE INSTRUCTIONS
Epididymitis and Orchitis: Care Instructions  Your Care Instructions    Epididymitis is pain and swelling of the tube that is attached to each testicle. This tube is called the epididymis. Orchitis is pain and swelling of the testicle. Infection with bacteria often causes these conditions. Sexually transmitted infections (STIs) also can cause both conditions. This is often the case in men younger than 28. Other causes in boys and older men are infections from surgery or having a catheter that drains urine. The mumps virus also can cause orchitis. Anti-inflammatory or pain medicines can help with the pain. Antibiotics are used if the problem is caused by bacteria. They are not used if a virus is the cause. Your testicle may stay swollen for many days or even a few weeks. The doctor has checked you carefully, but problems can develop later. If you notice any problems or new symptoms, get medical treatment right away. Follow-up care is a key part of your treatment and safety. Be sure to make and go to all appointments, and call your doctor if you are having problems. It's also a good idea to know your test results and keep a list of the medicines you take. How can you care for yourself at home? · If your doctor prescribed antibiotics, take them as directed. Do not stop taking them just because you feel better. You need to take the full course of antibiotics. · Ask your doctor if you can take an over-the-counter pain medicine, such as acetaminophen (Tylenol), ibuprofen (Advil, Motrin), or naproxen (Aleve). Be safe with medicines. Read and follow all instructions on the label. · Limit your activity to what is comfortable. · Wear snug underwear or an athletic supporter. This can help reduce pain. · Apply either cold or heat to the swollen area. Use the one that works best for your pain. Sitting in a warm bath for 15 minutes twice a day will help reduce the swelling more quickly.   · If you have been told that an STI may have caused your condition, do not have sex until your doctor says it is safe. This will prevent spreading the infection. Tell your sex partner or partners that they need to be checked. They may need treatment. When should you call for help? Call your doctor now or seek immediate medical care if:    · Your pain gets worse.     · You have a new or higher fever.     · You have new or more swelling of your testicle.     · You have new belly pain, or your pain gets worse.    Watch closely for changes in your health, and be sure to contact your doctor if:    · You do not get better as expected. Where can you learn more? Go to http://cayla-kathy.info/. Enter S360 in the search box to learn more about \"Epididymitis and Orchitis: Care Instructions. \"  Current as of: December 19, 2018  Content Version: 12.2  © 8811-3243 [employer]. Care instructions adapted under license by [employer] (which disclaims liability or warranty for this information). If you have questions about a medical condition or this instruction, always ask your healthcare professional. [place] [address] any warranty or liability for your use of this information. Patient Education        Testicular Pain: Care Instructions  Your Care Instructions    Pain in the testicles can be caused by many things. These include an injury to your testicles, an infection, and testicular torsion. Injuries and genital problems most often happen during sports or recreational activities, at work, or in a fall. Pain caused by an injury usually goes away quickly. There is usually no long-term harm to your testicles. Infections that may cause pain include:  · An infection of the testicles. This is called orchitis. · An abscess in the scrotum or testicles. · Some sexually transmitted infections (STIs). · A swelling of the tube attached to a testicle. This swelling is called epididymitis.  It can cause pain and is sometimes caused by an infection. Testicular torsion happens when a testicle twists on the spermatic cord. This cuts off the blood supply to the testicle. This is a serious condition that requires surgery. Follow-up care is a key part of your treatment and safety. Be sure to make and go to all appointments, and call your doctor if you are having problems. It's also a good idea to know your test results and keep a list of the medicines you take. How can you care for yourself at home? · Rest and protect your testicles and groin. Stop, change, or take a break from any activity that may be causing your pain or soreness. · Put ice or a cold pack on the area for 10 to 20 minutes at a time. Put a thin cloth between the ice and your skin. · Wear briefs, not boxers. Briefs help support the injured area. You can use a jock strap if it helps relieve your pain. · If your doctor prescribed antibiotics, take them as directed. Do not stop taking them just because you feel better. You need to take the full course of antibiotics. · Ask your doctor if you can take an over-the-counter pain medicine, such as acetaminophen (Tylenol), ibuprofen (Advil, Motrin), or naproxen (Aleve). Be safe with medicines. Read and follow all instructions on the label. · If the doctor gave you a prescription medicine for pain, take it as prescribed. When should you call for help? Call your doctor now or seek immediate medical care if:    · You have severe or increasing pain.     · You notice a change in how your testicles look or are positioned in your scrotum.     · You notice new or worse swelling in your scrotum.     · You have symptoms of a urinary problem, such as a urinary tract infection. These may include:  ? Pain or burning when you urinate. ? A frequent need to urinate without being able to pass much urine.   ? Pain in the flank, which is just below the rib cage and above the waist on either side of the back.  ? Blood in your urine. ? A fever.    Watch closely for changes in your health, and be sure to contact your doctor if:    · You do not get better as expected. Where can you learn more? Go to http://cayla-kathy.info/. Enter P284 in the search box to learn more about \"Testicular Pain: Care Instructions. \"  Current as of: December 19, 2018  Content Version: 12.2  © 5689-1989 [employer]. Care instructions adapted under license by [employer] (which disclaims liability or warranty for this information). If you have questions about a medical condition or this instruction, always ask your healthcare professional. [address] any warranty or liability for your use of this information. Patient Education        Abdominal Pain: Care Instructions  Your Care Instructions    Abdominal pain has many possible causes. Some aren't serious and get better on their own in a few days. Others need more testing and treatment. If your pain continues or gets worse, you need to be rechecked and may need more tests to find out what is wrong. You may need surgery to correct the problem. Don't ignore new symptoms, such as fever, nausea and vomiting, urination problems, pain that gets worse, and dizziness. These may be signs of a more serious problem. Your doctor may have recommended a follow-up visit in the next 8 to 12 hours. If you are not getting better, you may need more tests or treatment. The doctor has checked you carefully, but problems can develop later. If you notice any problems or new symptoms, get medical treatment right away. Follow-up care is a key part of your treatment and safety. Be sure to make and go to all appointments, and call your doctor if you are having problems. It's also a good idea to know your test results and keep a list of the medicines you take. How can you care for yourself at home? · Rest until you feel better.   · To prevent dehydration, drink plenty of fluids, enough so that your urine is light yellow or clear like water. Choose water and other caffeine-free clear liquids until you feel better. If you have kidney, heart, or liver disease and have to limit fluids, talk with your doctor before you increase the amount of fluids you drink. · If your stomach is upset, eat mild foods, such as rice, dry toast or crackers, bananas, and applesauce. Try eating several small meals instead of two or three large ones. · Wait until 48 hours after all symptoms have gone away before you have spicy foods, alcohol, and drinks that contain caffeine. · Do not eat foods that are high in fat. · Avoid anti-inflammatory medicines such as aspirin, ibuprofen (Advil, Motrin), and naproxen (Aleve). These can cause stomach upset. Talk to your doctor if you take daily aspirin for another health problem. When should you call for help? Call 911 anytime you think you may need emergency care. For example, call if:    · You passed out (lost consciousness).     · You pass maroon or very bloody stools.     · You vomit blood or what looks like coffee grounds.     · You have new, severe belly pain.    Call your doctor now or seek immediate medical care if:    · Your pain gets worse, especially if it becomes focused in one area of your belly.     · You have a new or higher fever.     · Your stools are black and look like tar, or they have streaks of blood.     · You have unexpected vaginal bleeding.     · You have symptoms of a urinary tract infection. These may include:  ? Pain when you urinate. ? Urinating more often than usual.  ? Blood in your urine.     · You are dizzy or lightheaded, or you feel like you may faint.    Watch closely for changes in your health, and be sure to contact your doctor if:    · You are not getting better after 1 day (24 hours). Where can you learn more? Go to http://cayla-kathy.info/.   Enter N013 in the search box to learn more about \"Abdominal Pain: Care Instructions. \"  Current as of: June 26, 2019  Content Version: 12.2  © 0124-0318 [employer]. Care instructions adapted under license by [employer] (which disclaims liability or warranty for this information). If you have questions about a medical condition or this instruction, always ask your healthcare professional. [address] any warranty or liability for your use of this information.

## 2019-11-25 LAB
C TRACH RRNA SPEC QL NAA+PROBE: NEGATIVE
N GONORRHOEA RRNA SPEC QL NAA+PROBE: NEGATIVE
SPECIMEN SOURCE: NORMAL

## 2019-12-10 ENCOUNTER — HOSPITAL ENCOUNTER (EMERGENCY)
Age: 34
Discharge: HOME | End: 2019-12-10
Attending: EMERGENCY MEDICINE
Payer: SELF-PAY

## 2019-12-10 VITALS
DIASTOLIC BLOOD PRESSURE: 88 MMHG | TEMPERATURE: 97.9 F | RESPIRATION RATE: 18 BRPM | SYSTOLIC BLOOD PRESSURE: 123 MMHG | OXYGEN SATURATION: 98 % | WEIGHT: 170 LBS | BODY MASS INDEX: 22.43 KG/M2 | HEART RATE: 77 BPM

## 2019-12-10 DIAGNOSIS — R11.2 CANNABINOID HYPEREMESIS SYNDROME: Primary | ICD-10-CM

## 2019-12-10 DIAGNOSIS — F12.90 CANNABINOID HYPEREMESIS SYNDROME: Primary | ICD-10-CM

## 2019-12-10 LAB
ALBUMIN SERPL-MCNC: 4.5 G/DL (ref 3.4–5)
ALBUMIN/GLOB SERPL: 1.1 {RATIO} (ref 0.8–1.7)
ALP SERPL-CCNC: 57 U/L (ref 45–117)
ALT SERPL-CCNC: 34 U/L (ref 16–61)
ANION GAP SERPL CALC-SCNC: 8 MMOL/L (ref 3–18)
AST SERPL-CCNC: 27 U/L (ref 10–38)
BASOPHILS # BLD: 0 K/UL (ref 0–0.1)
BASOPHILS NFR BLD: 0 % (ref 0–2)
BILIRUB SERPL-MCNC: 0.7 MG/DL (ref 0.2–1)
BUN SERPL-MCNC: 19 MG/DL (ref 7–18)
BUN/CREAT SERPL: 15 (ref 12–20)
CALCIUM SERPL-MCNC: 9.5 MG/DL (ref 8.5–10.1)
CHLORIDE SERPL-SCNC: 105 MMOL/L (ref 100–111)
CO2 SERPL-SCNC: 24 MMOL/L (ref 21–32)
CREAT SERPL-MCNC: 1.3 MG/DL (ref 0.6–1.3)
DIFFERENTIAL METHOD BLD: ABNORMAL
EOSINOPHIL # BLD: 0 K/UL (ref 0–0.4)
EOSINOPHIL NFR BLD: 0 % (ref 0–5)
ERYTHROCYTE [DISTWIDTH] IN BLOOD BY AUTOMATED COUNT: 12.9 % (ref 11.6–14.5)
GLOBULIN SER CALC-MCNC: 4.2 G/DL (ref 2–4)
GLUCOSE SERPL-MCNC: 122 MG/DL (ref 74–99)
HCT VFR BLD AUTO: 42.4 % (ref 36–48)
HGB BLD-MCNC: 14.9 G/DL (ref 13–16)
LIPASE SERPL-CCNC: 112 U/L (ref 73–393)
LYMPHOCYTES # BLD: 2.4 K/UL (ref 0.9–3.6)
LYMPHOCYTES NFR BLD: 32 % (ref 21–52)
MCH RBC QN AUTO: 32.1 PG (ref 24–34)
MCHC RBC AUTO-ENTMCNC: 35.1 G/DL (ref 31–37)
MCV RBC AUTO: 91.4 FL (ref 74–97)
MONOCYTES # BLD: 0.4 K/UL (ref 0.05–1.2)
MONOCYTES NFR BLD: 5 % (ref 3–10)
NEUTS SEG # BLD: 4.6 K/UL (ref 1.8–8)
NEUTS SEG NFR BLD: 63 % (ref 40–73)
PLATELET # BLD AUTO: 219 K/UL (ref 135–420)
PMV BLD AUTO: 10.5 FL (ref 9.2–11.8)
POTASSIUM SERPL-SCNC: 3.6 MMOL/L (ref 3.5–5.5)
PROT SERPL-MCNC: 8.7 G/DL (ref 6.4–8.2)
RBC # BLD AUTO: 4.64 M/UL (ref 4.7–5.5)
SODIUM SERPL-SCNC: 137 MMOL/L (ref 136–145)
WBC # BLD AUTO: 7.4 K/UL (ref 4.6–13.2)

## 2019-12-10 PROCEDURE — 96374 THER/PROPH/DIAG INJ IV PUSH: CPT

## 2019-12-10 PROCEDURE — 83690 ASSAY OF LIPASE: CPT

## 2019-12-10 PROCEDURE — 85025 COMPLETE CBC W/AUTO DIFF WBC: CPT

## 2019-12-10 PROCEDURE — 99284 EMERGENCY DEPT VISIT MOD MDM: CPT

## 2019-12-10 PROCEDURE — 96375 TX/PRO/DX INJ NEW DRUG ADDON: CPT

## 2019-12-10 PROCEDURE — 80053 COMPREHEN METABOLIC PANEL: CPT

## 2019-12-10 PROCEDURE — 74011250636 HC RX REV CODE- 250/636: Performed by: EMERGENCY MEDICINE

## 2019-12-10 RX ORDER — MORPHINE SULFATE 4 MG/ML
4 INJECTION INTRAVENOUS
Status: COMPLETED | OUTPATIENT
Start: 2019-12-10 | End: 2019-12-10

## 2019-12-10 RX ORDER — ONDANSETRON 2 MG/ML
4 INJECTION INTRAMUSCULAR; INTRAVENOUS
Status: COMPLETED | OUTPATIENT
Start: 2019-12-10 | End: 2019-12-10

## 2019-12-10 RX ORDER — ONDANSETRON 4 MG/1
4 TABLET, FILM COATED ORAL
Qty: 12 TAB | Refills: 0 | OUTPATIENT
Start: 2019-12-10 | End: 2021-08-27

## 2019-12-10 RX ADMIN — MORPHINE SULFATE 4 MG: 4 INJECTION INTRAVENOUS at 10:36

## 2019-12-10 RX ADMIN — ONDANSETRON 4 MG: 2 INJECTION INTRAMUSCULAR; INTRAVENOUS at 10:36

## 2019-12-10 RX ADMIN — SODIUM CHLORIDE 1000 ML: 900 INJECTION, SOLUTION INTRAVENOUS at 10:36

## 2019-12-10 NOTE — DISCHARGE INSTRUCTIONS
Patient Education        Marijuana Use: Care Instructions  Overview  During your exam, traces of marijuana were found in your body. The two most active chemicals in marijuana are THC and CBD. THC affects how you think, act, and feel. It can make you feel very happy or \"high. \" CBD can help you feel relaxed without the \"high. \" Marijuana products usually contain both THC and CBD. THC usually can be found in urine for a few days after marijuana is used. If you regularly use a lot of marijuana, THC may be found for weeks after use has stopped. There are many types, or strains, of marijuana. Each strain has specific THC-to-CBD ratios. Because of this, some strains have different kinds of effects than others. For example, if a strain of marijuana has a higher ratio of THC to CBD, it's more likely to affect your judgment, coordination, and decision making. In the [place], it's against federal law to possess, sell, give away, or grow marijuana for any purpose. But many states allow people with certain health problems to buy or grow it for their own use. And some states allow people to use it for recreational reasons. These laws vary from state to state. You can call your state department of health or health services to learn more about the laws in your state. If you live in a state where marijuana is legal, know your employer's policies about use. A positive drug test might cause you to lose your job. Or it might keep you from getting hired. If you use marijuana, take steps to lower your risk. Follow-up care is a key part of your treatment and safety. Be sure to make and go to all appointments, and call your doctor if you are having problems. It's also a good idea to know your test results and keep a list of the medicines you take. How can you care for yourself at home? · To have the lowest risk, don't use marijuana. But if you do use it, limit your use. · Know what you're using.  Choose products that have low levels of THC. The type (or strain), strength, and effects of marijuana can vary greatly. And understand how soon you may feel the effects of the product you use and how long those effects may last. The product label may have this information. · Don't drive or operate machinery after using marijuana. Using marijuana may affect your judgment, coordination, and decision making. · Don't smoke marijuana. The smoke can damage your lungs. If you do smoke it, don't breathe in deeply and don't hold your breath. · Don't use marijuana with alcohol, tobacco, or illegal drugs. · Reduce the risk of medicine interactions. Marijuana can be dangerous if you take it with blood thinners or with medicines that make you sleepy, control your mood, or lower your blood pressure. Talk to your doctor about other medicines you use before you try marijuana. · Keep others safe. Store marijuana in a safe and secure place. This is even more important with edible marijuana, which can be easily mistaken for treats or snacks. Make sure that children, friends, family, and pets can't get to it. And protect others from secondhand smoke. When should you call for help? Call your doctor now or seek immediate medical care if:    · You have new or worse symptoms of cannabis hyperemesis syndrome (CHS), such as:  ? Vomiting that doesn't stop. ? Not being able to keep down fluids. ? Belly pain. ? Symptoms that go away briefly when you take a hot bath or shower. This is one of the signs of CHS.     · You have symptoms of dehydration, such as:  ? Dry eyes and a dry mouth. ? Passing only a little dark urine. ? Feeling thirstier than usual.    Watch closely for changes in your health, and contact your doctor if:    · You think you have a problem with marijuana use. Where can you learn more? Go to http://cayla-kathy.info/. Enter K129 in the search box to learn more about \"Marijuana Use: Care Instructions. \"  Current as of: February 5, 2019  Content Version: 12.2  © 3473-1376 [employer]. Care instructions adapted under license by [employer] (which disclaims liability or warranty for this information). If you have questions about a medical condition or this instruction, always ask your healthcare professional. [address] any warranty or liability for your use of this information.

## 2019-12-10 NOTE — ED TRIAGE NOTES
Pt arrives with complaint of nausea and vomiting for several days. PT denies pain but states stomach is always upset.

## 2019-12-10 NOTE — ED PROVIDER NOTES
EMERGENCY DEPARTMENT HISTORY AND PHYSICAL EXAM    10:19 AM       Date: 12/10/2019  Patient Name: Zaid Rudolph    History of Presenting Illness     Chief Complaint   Patient presents with    Vomiting    Nausea         History Provided By: Patient    Additional History (Context): Zaid Rudolph is a 26 y.o. male with No significant past medical history who presents with nausea and vomiting for 3 days. Patient was seen at [facility] had a full work-up. He states however his nausea has not resolved. He has mild abdominal pain secondary to vomiting. He denies fever or diarrhea. Patient denies cigarettes smoking. He states rare alcohol use. He admits to daily marijuana use. PCP: None      Current Outpatient Medications   Medication Sig Dispense Refill    ondansetron hcl (ZOFRAN) 4 mg tablet Take 1 Tab by mouth every eight (8) hours as needed for Nausea. 12 Tab 0       Past History     Past Medical History:  Past Medical History:   Diagnosis Date    Abdominal pain     Gastroenteritis     H/O emphysema     Hypokalemia     Syncope and collapse        Past Surgical History:  No past surgical history on file. Family History:  No family history on file. Social History:  Social History     Tobacco Use    Smoking status: Never Smoker    Smokeless tobacco: Never Used   Substance Use Topics    Alcohol use: No    Drug use: Yes     Types: Marijuana     Comment: States daily marijuana use. Allergies: Allergies   Allergen Reactions    Ibuprofen Hives         Review of Systems       Review of Systems   Constitutional: Negative. Negative for chills, diaphoresis and fever. HENT: Negative. Negative for congestion, rhinorrhea and sore throat. Eyes: Negative. Negative for pain, discharge and redness. Respiratory: Negative. Negative for cough, chest tightness, shortness of breath and wheezing. Cardiovascular: Negative. Negative for chest pain.    Gastrointestinal: Positive for nausea and vomiting. Negative for abdominal pain, constipation and diarrhea. Genitourinary: Positive for penile pain. Negative for dysuria, flank pain, frequency, hematuria and urgency. Musculoskeletal: Negative. Negative for back pain and neck pain. Skin: Negative. Negative for rash. Neurological: Negative. Negative for syncope, weakness, numbness and headaches. Psychiatric/Behavioral: Negative. All other systems reviewed and are negative. Physical Exam     Visit Vitals  /88   Pulse 77   Temp 97.9 °F (36.6 °C)   Resp 18   Wt 77.1 kg (170 lb)   SpO2 98%   BMI 22.43 kg/m²         Physical Exam  Vitals signs and nursing note reviewed. Constitutional:       General: He is not in acute distress. Appearance: Normal appearance. He is well-developed. He is ill-appearing. He is not toxic-appearing or diaphoretic. HENT:      Head: Normocephalic and atraumatic. Mouth/Throat:      Pharynx: No oropharyngeal exudate. Eyes:      General: No scleral icterus. Conjunctiva/sclera: Conjunctivae normal.      Pupils: Pupils are equal, round, and reactive to light. Neck:      Musculoskeletal: Normal range of motion and neck supple. Thyroid: No thyromegaly. Vascular: No hepatojugular reflux or JVD. Trachea: No tracheal deviation. Cardiovascular:      Rate and Rhythm: Normal rate and regular rhythm. Pulses: Normal pulses. Radial pulses are 2+ on the right side and 2+ on the left side. Dorsalis pedis pulses are 2+ on the right side and 2+ on the left side. Heart sounds: Normal heart sounds, S1 normal and S2 normal. No murmur. No gallop. No S3 or S4 sounds. Pulmonary:      Effort: Pulmonary effort is normal. No respiratory distress. Breath sounds: Normal breath sounds. No decreased breath sounds, wheezing, rhonchi or rales. Abdominal:      General: Bowel sounds are normal. There is no distension. Palpations: Abdomen is soft.  Abdomen is not rigid. There is no mass. Tenderness: There is no tenderness. There is no guarding or rebound. Negative signs include Finch's sign and McBurney's sign. Musculoskeletal: Normal range of motion. Comments: Strength 4 out of 5   Lymphadenopathy:      Head:      Right side of head: No submental, submandibular, preauricular or occipital adenopathy. Left side of head: No submental, submandibular, preauricular or occipital adenopathy. Cervical: No cervical adenopathy. Upper Body:      Right upper body: No supraclavicular adenopathy. Left upper body: No supraclavicular adenopathy. Skin:     General: Skin is warm and dry. Findings: No rash. Neurological:      Mental Status: He is alert. He is not disoriented. GCS: GCS eye subscore is 4. GCS verbal subscore is 5. GCS motor subscore is 6. Cranial Nerves: No cranial nerve deficit. Sensory: No sensory deficit. Coordination: Coordination normal.      Gait: Gait normal.      Deep Tendon Reflexes: Reflexes are normal and symmetric. Comments: Grossly intact   Psychiatric:         Speech: Speech normal.         Behavior: Behavior normal.         Thought Content: Thought content normal.         Judgment: Judgment normal.           Diagnostic Study Results     Labs -  Recent Results (from the past 12 hour(s))   CBC WITH AUTOMATED DIFF    Collection Time: 12/10/19  9:53 AM   Result Value Ref Range    WBC 7.4 4.6 - 13.2 K/uL    RBC 4.64 (L) 4.70 - 5.50 M/uL    HGB 14.9 13.0 - 16.0 g/dL    HCT 42.4 36.0 - 48.0 %    MCV 91.4 74.0 - 97.0 FL    MCH 32.1 24.0 - 34.0 PG    MCHC 35.1 31.0 - 37.0 g/dL    RDW 12.9 11.6 - 14.5 %    PLATELET 087 946 - 262 K/uL    MPV 10.5 9.2 - 11.8 FL    NEUTROPHILS 63 40 - 73 %    LYMPHOCYTES 32 21 - 52 %    MONOCYTES 5 3 - 10 %    EOSINOPHILS 0 0 - 5 %    BASOPHILS 0 0 - 2 %    ABS. NEUTROPHILS 4.6 1.8 - 8.0 K/UL    ABS. LYMPHOCYTES 2.4 0.9 - 3.6 K/UL    ABS. MONOCYTES 0.4 0.05 - 1.2 K/UL    ABS. EOSINOPHILS 0.0 0.0 - 0.4 K/UL    ABS. BASOPHILS 0.0 0.0 - 0.1 K/UL    DF AUTOMATED     METABOLIC PANEL, COMPREHENSIVE    Collection Time: 12/10/19  9:53 AM   Result Value Ref Range    Sodium 137 136 - 145 mmol/L    Potassium 3.6 3.5 - 5.5 mmol/L    Chloride 105 100 - 111 mmol/L    CO2 24 21 - 32 mmol/L    Anion gap 8 3.0 - 18 mmol/L    Glucose 122 (H) 74 - 99 mg/dL    BUN 19 (H) 7.0 - 18 MG/DL    Creatinine 1.30 0.6 - 1.3 MG/DL    BUN/Creatinine ratio 15 12 - 20      GFR est AA >60 >60 ml/min/1.73m2    GFR est non-AA >60 >60 ml/min/1.73m2    Calcium 9.5 8.5 - 10.1 MG/DL    Bilirubin, total 0.7 0.2 - 1.0 MG/DL    ALT (SGPT) 34 16 - 61 U/L    AST (SGOT) 27 10 - 38 U/L    Alk. phosphatase 57 45 - 117 U/L    Protein, total 8.7 (H) 6.4 - 8.2 g/dL    Albumin 4.5 3.4 - 5.0 g/dL    Globulin 4.2 (H) 2.0 - 4.0 g/dL    A-G Ratio 1.1 0.8 - 1.7     LIPASE    Collection Time: 12/10/19  9:53 AM   Result Value Ref Range    Lipase 112 73 - 393 U/L       Radiologic Studies -   No orders to display         Medical Decision Making   Provider Notes (Medical Decision Making):  MDM  Number of Diagnoses or Management Options  Cannabinoid hyperemesis syndrome [facility]):   Diagnosis management comments: DDX: Gastritis, gerd, peptic ulcer disease, cholecystitis, pancreatitis, gastroenteritis, hepatitis, constipation related pain, appendicitis pain, diverticulitis, urinary tract infection, obstruction, abdominal wall pain, atypical cardiac (ami or anginal pain), referred pain from pulmonary process (pneumonia, empyema),  or combination of the above versus many other processes. I am the first provider for this patient. I reviewed the vital signs, available nursing notes, past medical history, past surgical history, family history and social history. Vital Signs-Reviewed the patient's vital signs.     Records Reviewed: Nursing Notes (Time of Review: 10:19 AM)    ED Course: Progress Notes, Reevaluation, and Consults:    Labs essentially normal.  12:19 AM 12/10/2019        Diagnosis       I have reassessed the patient. Patient is feeling well. Patient counseled about marijuana use. Patient will be prescribed Zofran. Patient was discharged in stable condition. Patient is to return to emergency department if any new or worsening condition. Clinical Impression:   1. Cannabinoid hyperemesis syndrome ([address]        Disposition: Discharged home     Follow-up Information     Follow up With Specialties Details Why [address]  In 2 days  [address]  [facility] [address] [facility] [address]             Attestation        Provider Attestation:     I personally performed the services described in the documentation, reviewed the documentation and it accurately and completely records my words and actions utilizing the [address] [place] December 10, 2019 at 7:11 PM - [place], [address]. It is dictated using utilizing voice recognition software. Unfortunately this leads to occasional typographical errors. I apologize in advance if the situation occurs. If questions arise please do not hesitate to contact me or call our department.

## 2020-06-08 ENCOUNTER — HOSPITAL ENCOUNTER (EMERGENCY)
Age: 35
Discharge: HOME | End: 2020-06-08
Attending: EMERGENCY MEDICINE
Payer: SELF-PAY

## 2020-06-08 VITALS
BODY MASS INDEX: 22.53 KG/M2 | OXYGEN SATURATION: 99 % | TEMPERATURE: 98.2 F | HEIGHT: 73 IN | RESPIRATION RATE: 16 BRPM | WEIGHT: 170 LBS | SYSTOLIC BLOOD PRESSURE: 113 MMHG | DIASTOLIC BLOOD PRESSURE: 74 MMHG | HEART RATE: 85 BPM

## 2020-06-08 DIAGNOSIS — R11.2 NON-INTRACTABLE VOMITING WITH NAUSEA, UNSPECIFIED VOMITING TYPE: Primary | ICD-10-CM

## 2020-06-08 LAB
ALBUMIN SERPL-MCNC: 4.6 G/DL (ref 3.4–5)
ALBUMIN/GLOB SERPL: 1 {RATIO} (ref 0.8–1.7)
ALP SERPL-CCNC: 58 U/L (ref 45–117)
ALT SERPL-CCNC: 27 U/L (ref 16–61)
ANION GAP SERPL CALC-SCNC: 7 MMOL/L (ref 3–18)
AST SERPL-CCNC: 23 U/L (ref 10–38)
BASOPHILS # BLD: 0 K/UL (ref 0–0.1)
BASOPHILS NFR BLD: 0 % (ref 0–2)
BILIRUB SERPL-MCNC: 1 MG/DL (ref 0.2–1)
BUN SERPL-MCNC: 20 MG/DL (ref 7–18)
BUN/CREAT SERPL: 16 (ref 12–20)
CALCIUM SERPL-MCNC: 9.5 MG/DL (ref 8.5–10.1)
CHLORIDE SERPL-SCNC: 98 MMOL/L (ref 100–111)
CO2 SERPL-SCNC: 28 MMOL/L (ref 21–32)
CREAT SERPL-MCNC: 1.26 MG/DL (ref 0.6–1.3)
DIFFERENTIAL METHOD BLD: NORMAL
EOSINOPHIL # BLD: 0 K/UL (ref 0–0.4)
EOSINOPHIL NFR BLD: 0 % (ref 0–5)
ERYTHROCYTE [DISTWIDTH] IN BLOOD BY AUTOMATED COUNT: 12.5 % (ref 11.6–14.5)
GLOBULIN SER CALC-MCNC: 4.6 G/DL (ref 2–4)
GLUCOSE SERPL-MCNC: 102 MG/DL (ref 74–99)
HCT VFR BLD AUTO: 42.9 % (ref 36–48)
HGB BLD-MCNC: 15.3 G/DL (ref 13–16)
LIPASE SERPL-CCNC: 99 U/L (ref 73–393)
LYMPHOCYTES # BLD: 3 K/UL (ref 0.9–3.6)
LYMPHOCYTES NFR BLD: 34 % (ref 21–52)
MCH RBC QN AUTO: 32.6 PG (ref 24–34)
MCHC RBC AUTO-ENTMCNC: 35.7 G/DL (ref 31–37)
MCV RBC AUTO: 91.3 FL (ref 74–97)
MONOCYTES # BLD: 0.7 K/UL (ref 0.05–1.2)
MONOCYTES NFR BLD: 8 % (ref 3–10)
NEUTS SEG # BLD: 5.2 K/UL (ref 1.8–8)
NEUTS SEG NFR BLD: 58 % (ref 40–73)
PLATELET # BLD AUTO: 241 K/UL (ref 135–420)
PMV BLD AUTO: 10.1 FL (ref 9.2–11.8)
POTASSIUM SERPL-SCNC: 3.7 MMOL/L (ref 3.5–5.5)
PROT SERPL-MCNC: 9.2 G/DL (ref 6.4–8.2)
RBC # BLD AUTO: 4.7 M/UL (ref 4.7–5.5)
SODIUM SERPL-SCNC: 133 MMOL/L (ref 136–145)
WBC # BLD AUTO: 8.8 K/UL (ref 4.6–13.2)

## 2020-06-08 PROCEDURE — 99283 EMERGENCY DEPT VISIT LOW MDM: CPT

## 2020-06-08 PROCEDURE — 74011250636 HC RX REV CODE- 250/636: Performed by: PHYSICIAN ASSISTANT

## 2020-06-08 PROCEDURE — 80053 COMPREHEN METABOLIC PANEL: CPT

## 2020-06-08 PROCEDURE — 83690 ASSAY OF LIPASE: CPT

## 2020-06-08 PROCEDURE — 96374 THER/PROPH/DIAG INJ IV PUSH: CPT

## 2020-06-08 PROCEDURE — 85025 COMPLETE CBC W/AUTO DIFF WBC: CPT

## 2020-06-08 RX ORDER — ONDANSETRON 4 MG/1
TABLET, ORALLY DISINTEGRATING ORAL
Qty: 10 TAB | Refills: 0 | OUTPATIENT
Start: 2020-06-08 | End: 2021-08-27

## 2020-06-08 RX ORDER — ONDANSETRON 2 MG/ML
4 INJECTION INTRAMUSCULAR; INTRAVENOUS
Status: COMPLETED | OUTPATIENT
Start: 2020-06-08 | End: 2020-06-08

## 2020-06-08 RX ADMIN — ONDANSETRON 4 MG: 2 INJECTION INTRAMUSCULAR; INTRAVENOUS at 14:30

## 2020-06-08 NOTE — ED TRIAGE NOTES
\"I've been vomiting for the past four days. Sometimes when I go to have a bowel movement, there is blood. I think I overdid it when I drank on Wednesday. \"  Denies urinary pain, frequency, or urgency.

## 2020-06-08 NOTE — DISCHARGE INSTRUCTIONS
Patient Education     Please return immediately to the Emergency Room for re-evaluation if you are not improving, develop any new symptoms, or develop worsening of current symptoms! If you have been prescribed a medication and are unable to take this medication for any reason, please return to the Emergency Department for further evaluation! If you have been referred for follow-up to a specialist, but are unable to follow-up and your symptoms are either not improving or are worsening, please return to the Emergency Department for further evaluation! Nausea and Vomiting: Care Instructions  Your Care Instructions     When you are nauseated, you may feel weak and sweaty and notice a lot of saliva in your mouth. Nausea often leads to vomiting. Most of the time you do not need to worry about nausea and vomiting, but they can be signs of other illnesses. Two common causes of nausea and vomiting are stomach flu and food poisoning. Nausea and vomiting from viral stomach flu will usually start to improve within 24 hours. Nausea and vomiting from food poisoning may last from 12 to 48 hours. The doctor has checked you carefully, but problems can develop later. If you notice any problems or new symptoms, get medical treatment right away. Follow-up care is a key part of your treatment and safety. Be sure to make and go to all appointments, and call your doctor if you are having problems. It's also a good idea to know your test results and keep a list of the medicines you take. How can you care for yourself at home? · To prevent dehydration, drink plenty of fluids, enough so that your urine is light yellow or clear like water. Choose water and other caffeine-free clear liquids until you feel better. If you have kidney, heart, or liver disease and have to limit fluids, talk with your doctor before you increase the amount of fluids you drink. · Rest in bed until you feel better.   · When you are able to eat, try clear soups, mild foods, and liquids until all symptoms are gone for 12 to 48 hours. Other good choices include dry toast, crackers, cooked cereal, and gelatin dessert, such as Jell-O. When should you call for help? [id number] anytime you think you may need emergency care. For example, call if:  · You passed out (lost consciousness). Call your doctor now or seek immediate medical care if:  · You have symptoms of dehydration, such as:  ? Dry eyes and a dry mouth. ? Passing only a little dark urine. ? Feeling thirstier than usual.  · You have new or worsening belly pain. · You have a new or higher fever. · You vomit blood or what looks like coffee grounds. Watch closely for changes in your health, and be sure to contact your doctor if:  · You have ongoing nausea and vomiting. · Your vomiting is getting worse. · Your vomiting lasts longer than 2 days. · You are not getting better as expected. Where can you learn more? Go to http://cayla-kathy.info/  Enter H591 in the search box to learn more about \"Nausea and Vomiting: Care Instructions. \"  Current as of: June 26, 2019               Content Version: 12.5  © 4348-1050 Healthwise, Incorporated. Care instructions adapted under license by [employer] (which disclaims liability or warranty for this information). If you have questions about a medical condition or this instruction, always ask your healthcare professional. [place] [address] any warranty or liability for your use of this information.

## 2020-06-08 NOTE — ED NOTES
notice no PCP or insurance listed in patient's chart.  went RW-2 to assess patient.  spoke to patient about [facility] and the documentations needed. Patient was told to provide his ID and W-6 form. Patient stated that he will bring documentation needed for [facility] office.  encouraged patient to apply for medicaid.  provided patient with social service information and [employer]. Patient received [employer] web site and phone number.  was able to schedule patient appointment for Thursday July 9, 2020 at 2:00 pm with Wendy Choi MD at [facility].  handed patient 's contact information and appointment reminder letter.

## 2020-06-08 NOTE — ED PROVIDER NOTES
EMERGENCY DEPARTMENT HISTORY AND PHYSICAL EXAM    1:24 PM      Date: 6/8/2020  Patient Name: Angelica Gipson    History of Presenting Illness     Chief Complaint   Patient presents with    Vomiting       History Provided By: Patient    Chief Complaint: nausea, vomiting  Duration: 2 Days  Timing:  Acute  Location:   Quality: N/A  Severity: N/A  Modifying Factors: none  Associated Symptoms: denies any other associated signs or symptoms      Additional History (Context): Angelica Gipson is a 37 y.o. male with a pertinent history of gastritis who presents to the emergency department for evaluation of nausea and vomiting since Friday night. Patient states he drank too much alcohol and knows that he overdid it. He denies any associated pain diarrhea, constipation, chest pain, shortness of breath, fever, chills, cough, URI symptoms, or any other complaints. Patient states he just needs something to help his nausea. PCP:  None      Current Facility-Administered Medications   Medication Dose Route Frequency Provider Last Rate Last Dose    sodium chloride 0.9 % bolus infusion 1,000 mL  1,000 mL IntraVENous ONCE Jae Jc PA-C         Current Outpatient Medications   Medication Sig Dispense Refill    ondansetron (Zofran ODT) 4 mg disintegrating tablet Take 1-2 tablets every 6-8 hours as needed for nausea and vomiting. 10 Tab 0    ondansetron hcl (ZOFRAN) 4 mg tablet Take 1 Tab by mouth every eight (8) hours as needed for Nausea. 12 Tab 0       Past History     Past Medical History:  Past Medical History:   Diagnosis Date    Abdominal pain     Gastroenteritis     H/O emphysema     Hypokalemia     Syncope and collapse        Past Surgical History:  History reviewed. No pertinent surgical history. Family History:  History reviewed. No pertinent family history.     Social History:  Social History     Tobacco Use    Smoking status: Never Smoker    Smokeless tobacco: Never Used   Substance Use Topics    Alcohol use: No    Drug use: Yes     Types: Marijuana     Comment: States daily marijuana use. Allergies: Allergies   Allergen Reactions    Ibuprofen Hives         Review of Systems       Review of Systems   Constitutional: Negative for chills and fever. HENT: Negative for congestion, rhinorrhea and sore throat. Respiratory: Negative for cough and shortness of breath. Cardiovascular: Negative for chest pain. Gastrointestinal: Positive for nausea and vomiting. Negative for abdominal pain, blood in stool, constipation and diarrhea. Genitourinary: Negative for dysuria, frequency and hematuria. Musculoskeletal: Negative for back pain and myalgias. Skin: Negative for rash and wound. Neurological: Negative for dizziness and headaches. All other systems reviewed and are negative. Physical Exam     Visit Vitals  /74 (BP 1 Location: Left arm, BP Patient Position: At rest)   Pulse 94   Temp 98.2 °F (36.8 °C)   Resp 14   Ht 6' 1\" (1.854 m)   Wt 77.1 kg (170 lb)   SpO2 99%   BMI 22.43 kg/m²       Physical Exam  Vitals signs and nursing note reviewed. Constitutional:       General: He is not in acute distress. Appearance: He is well-developed. He is not diaphoretic. HENT:      Head: Normocephalic and atraumatic. Eyes:      Conjunctiva/sclera: Conjunctivae normal.   Neck:      Musculoskeletal: Normal range of motion and neck supple. Cardiovascular:      Rate and Rhythm: Normal rate and regular rhythm. Heart sounds: Normal heart sounds. Pulmonary:      Effort: Pulmonary effort is normal. No respiratory distress. Breath sounds: Normal breath sounds. Chest:      Chest wall: No tenderness. Abdominal:      General: Bowel sounds are normal. There is no distension. Palpations: Abdomen is soft. Tenderness: There is no abdominal tenderness. There is no guarding or rebound. Musculoskeletal: Normal range of motion. General: No deformity.    Skin: General: Skin is warm and dry. Neurological:      Mental Status: He is alert and oriented to person, place, and time. Diagnostic Study Results     Labs -  Recent Results (from the past 12 hour(s))   METABOLIC PANEL, COMPREHENSIVE    Collection Time: 06/08/20 12:55 PM   Result Value Ref Range    Sodium 133 (L) 136 - 145 mmol/L    Potassium 3.7 3.5 - 5.5 mmol/L    Chloride 98 (L) 100 - 111 mmol/L    CO2 28 21 - 32 mmol/L    Anion gap 7 3.0 - 18 mmol/L    Glucose 102 (H) 74 - 99 mg/dL    BUN 20 (H) 7.0 - 18 MG/DL    Creatinine 1.26 0.6 - 1.3 MG/DL    BUN/Creatinine ratio 16 12 - 20      GFR est AA >60 >60 ml/min/1.73m2    GFR est non-AA >60 >60 ml/min/1.73m2    Calcium 9.5 8.5 - 10.1 MG/DL    Bilirubin, total 1.0 0.2 - 1.0 MG/DL    ALT (SGPT) 27 16 - 61 U/L    AST (SGOT) 23 10 - 38 U/L    Alk. phosphatase 58 45 - 117 U/L    Protein, total 9.2 (H) 6.4 - 8.2 g/dL    Albumin 4.6 3.4 - 5.0 g/dL    Globulin 4.6 (H) 2.0 - 4.0 g/dL    A-G Ratio 1.0 0.8 - 1.7     CBC WITH AUTOMATED DIFF    Collection Time: 06/08/20 12:55 PM   Result Value Ref Range    WBC 8.8 4.6 - 13.2 K/uL    RBC 4.70 4.70 - 5.50 M/uL    HGB 15.3 13.0 - 16.0 g/dL    HCT 42.9 36.0 - 48.0 %    MCV 91.3 74.0 - 97.0 FL    MCH 32.6 24.0 - 34.0 PG    MCHC 35.7 31.0 - 37.0 g/dL    RDW 12.5 11.6 - 14.5 %    PLATELET 700 777 - 260 K/uL    MPV 10.1 9.2 - 11.8 FL    NEUTROPHILS 58 40 - 73 %    LYMPHOCYTES 34 21 - 52 %    MONOCYTES 8 3 - 10 %    EOSINOPHILS 0 0 - 5 %    BASOPHILS 0 0 - 2 %    ABS. NEUTROPHILS 5.2 1.8 - 8.0 K/UL    ABS. LYMPHOCYTES 3.0 0.9 - 3.6 K/UL    ABS. MONOCYTES 0.7 0.05 - 1.2 K/UL    ABS. EOSINOPHILS 0.0 0.0 - 0.4 K/UL    ABS. BASOPHILS 0.0 0.0 - 0.1 K/UL    DF AUTOMATED     LIPASE    Collection Time: 06/08/20 12:55 PM   Result Value Ref Range    Lipase 99 73 - 393 U/L       Radiologic Studies -   No results found. Medical Decision Making   I am the first provider for this patient.     I reviewed the vital signs, available nursing notes, past medical history, past surgical history, family history and social history. Vital Signs-Reviewed the patient's vital signs. Pulse Oximetry Analysis -  99% on room air (Interpretation)    Records Reviewed: Nursing Notes and Old Medical Records (Time of Review: 1:24 PM)    ED Course: Progress Notes, Reevaluation, and Consults:    Provider Notes (Medical Decision Making):   differential diagnosis: Gastritis, dehydration, electrolyte abnormality, highly unlikely pancreatitis    Plan: Patient presents ambulatory no significant distress with normal vitals. He states he actually feels better after arriving here. He has been treated with Zofran. Reassuring labs. Will discharge home with Zofran. Patient advised to follow-up with GI. Diagnosis     Clinical Impression:   1. Non-intractable vomiting with nausea, unspecified vomiting type        Disposition: MS Home    Follow-up Information     Follow up With Specialties Details Why Jenn Lai   Appointment scheduled for Thursday July 9, 2020 at 2:00 pm with Awilda Pritchard MD [facility], [address] [place] [address]  Phone: [phone]    [facility] EMERGENCY DEPT Emergency Medicine Go to As needed, If symptoms worsen [address] [facility] [address]  [phone]           Patient's Medications   Start Taking    ONDANSETRON (ZOFRAN ODT) 4 MG DISINTEGRATING TABLET    Take 1-2 tablets every 6-8 hours as needed for nausea and vomiting. Continue Taking    ONDANSETRON HCL (ZOFRAN) 4 MG TABLET    Take 1 Tab by mouth every eight (8) hours as needed for Nausea. These Medications have changed    No medications on file   Stop Taking    No medications on file     _______________________________    This note was dictated utilizing voice recognition software which may lead to typographical errors. I apologize in advance if the situation occurs. If questions arise please do not hesitate to contact me or call our department. Pauline Westbrook PA-C

## 2021-03-27 ENCOUNTER — APPOINTMENT (OUTPATIENT)
Dept: GENERAL RADIOLOGY | Age: 36
End: 2021-03-27
Attending: EMERGENCY MEDICINE
Payer: MEDICAID

## 2021-03-27 ENCOUNTER — HOSPITAL ENCOUNTER (EMERGENCY)
Age: 36
Discharge: HOME | End: 2021-03-27
Attending: EMERGENCY MEDICINE
Payer: MEDICAID

## 2021-03-27 VITALS
OXYGEN SATURATION: 99 % | HEIGHT: 73 IN | DIASTOLIC BLOOD PRESSURE: 69 MMHG | SYSTOLIC BLOOD PRESSURE: 107 MMHG | WEIGHT: 170 LBS | HEART RATE: 62 BPM | RESPIRATION RATE: 14 BRPM | TEMPERATURE: 98 F | BODY MASS INDEX: 22.53 KG/M2
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
## 2021-03-27 DIAGNOSIS — S20.212A RIB CONTUSION, LEFT, INITIAL ENCOUNTER: ICD-10-CM

## 2021-03-27 DIAGNOSIS — M25.551 RIGHT HIP PAIN: ICD-10-CM

## 2021-03-27 DIAGNOSIS — S93.401A SPRAIN OF RIGHT ANKLE, UNSPECIFIED LIGAMENT, INITIAL ENCOUNTER: ICD-10-CM

## 2021-03-27 PROCEDURE — 73502 X-RAY EXAM HIP UNI 2-3 VIEWS: CPT

## 2021-03-27 PROCEDURE — 73610 X-RAY EXAM OF ANKLE: CPT

## 2021-03-27 PROCEDURE — 71101 X-RAY EXAM UNILAT RIBS/CHEST: CPT

## 2021-03-27 PROCEDURE — 99282 EMERGENCY DEPT VISIT SF MDM: CPT

## 2021-03-27 RX ORDER — OXYCODONE AND ACETAMINOPHEN 5; 325 MG/1; MG/1
1 TABLET ORAL
Qty: 12 TAB | Refills: 0 | Status: SHIPPED | OUTPATIENT
Start: 2021-03-27 | End: 2021-03-30
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
## 2021-08-27 ENCOUNTER — HOSPITAL ENCOUNTER (EMERGENCY)
Age: 36
Discharge: HOME | End: 2021-08-27
Attending: EMERGENCY MEDICINE
Payer: MEDICAID

## 2021-08-27 VITALS
SYSTOLIC BLOOD PRESSURE: 118 MMHG | DIASTOLIC BLOOD PRESSURE: 72 MMHG | TEMPERATURE: 98.1 F | OXYGEN SATURATION: 98 % | RESPIRATION RATE: 18 BRPM | HEART RATE: 64 BPM

## 2021-08-27 DIAGNOSIS — Z20.2 EXPOSURE TO STD: Primary | ICD-10-CM

## 2021-08-27 PROCEDURE — 74011000250 HC RX REV CODE- 250: Performed by: PHYSICIAN ASSISTANT

## 2021-08-27 PROCEDURE — 96372 THER/PROPH/DIAG INJ SC/IM: CPT

## 2021-08-27 PROCEDURE — 99283 EMERGENCY DEPT VISIT LOW MDM: CPT

## 2021-08-27 PROCEDURE — 87591 N.GONORRHOEAE DNA AMP PROB: CPT

## 2021-08-27 PROCEDURE — 74011250636 HC RX REV CODE- 250/636: Performed by: PHYSICIAN ASSISTANT

## 2021-08-27 RX ORDER — DOXYCYCLINE HYCLATE 100 MG
100 TABLET ORAL 2 TIMES DAILY
Qty: 14 TABLET | Refills: 0 | Status: SHIPPED | OUTPATIENT
Start: 2021-08-27 | End: 2021-09-03

## 2021-08-27 RX ADMIN — LIDOCAINE HYDROCHLORIDE 500 MG: 10 INJECTION, SOLUTION EPIDURAL; INFILTRATION; INTRACAUDAL; PERINEURAL at 15:09

## 2021-08-27 NOTE — ED PROVIDER NOTES
EMERGENCY DEPARTMENT HISTORY AND PHYSICAL EXAM    1:24 PM      Date: 8/27/2021  Patient Name: Stephanie Berger    History of Presenting Illness     Chief Complaint   Patient presents with    Exposure to STD       History Provided By: Patient    Additional History (Context): Stephanie Berger is a 38 y.o. male with noted PMH who presents to the ED for STI testing and treatment. Patient notes partner called and told him to get tested and treated. Patient denies fever, chills, abdominal pain, testicular swelling or rash, dysuria, hematuria, penile discharge, or any complaints. PCP: None    Current Facility-Administered Medications   Medication Dose Route Frequency Provider Last Rate Last Admin    cefTRIAXone (ROCEPHIN) 500 mg in lidocaine (PF) (XYLOCAINE) 10 mg/mL (1 %) IM injection  500 mg [employer] [place]ural Flakes, [place], [address]         Current Outpatient Medications   Medication Sig Dispense Refill    doxycycline (VIBRA-TABS) 100 mg tablet Take 1 Tablet by mouth two (2) times a day for 7 days. 14 Tablet 0       Past History     Past Medical History:  Past Medical History:   Diagnosis Date    Abdominal pain     Gastroenteritis     H/O emphysema     Hypokalemia     Syncope and collapse        Past Surgical History:  No past surgical history on file. Family History:  No family history on file. Social History:  Social History     Tobacco Use    Smoking status: Never Smoker    Smokeless tobacco: Never Used   Substance Use Topics    Alcohol use: No    Drug use: Yes     Types: Marijuana     Comment: States daily marijuana use. Allergies: Allergies   Allergen Reactions    Ibuprofen Hives         Review of Systems       Review of Systems   Constitutional: Negative for chills and fever. Respiratory: Negative for shortness of breath. Cardiovascular: Negative for chest pain. Gastrointestinal: Negative for abdominal pain, nausea and vomiting.    Genitourinary: Negative for discharge, dysuria, genital sores, penile pain, penile swelling, scrotal swelling and testicular pain. Skin: Negative for rash. Neurological: Negative for weakness. All other systems reviewed and are negative. Physical Exam     Visit Vitals  /76   Pulse 62   Temp 98.1 °F (36.7 °C)   Resp 18   SpO2 99%         Physical Exam  Vitals and nursing note reviewed. Constitutional:       General: He is not in acute distress. Appearance: Normal appearance. He is well-developed. He is not ill-appearing, toxic-appearing or diaphoretic. HENT:      Head: Normocephalic and atraumatic. Cardiovascular:      Rate and Rhythm: Normal rate and regular rhythm. Heart sounds: Normal heart sounds. No murmur heard. No friction rub. No gallop. Pulmonary:      Effort: Pulmonary effort is normal. No respiratory distress. Breath sounds: Normal breath sounds. No wheezing or rales. Genitourinary:     Comments: Deferred   Musculoskeletal:         General: Normal range of motion. Cervical back: Normal range of motion and neck supple. Skin:     General: Skin is warm. Findings: No rash. Neurological:      Mental Status: He is alert. Diagnostic Study Results     Labs -  No results found for this or any previous visit (from the past 12 hour(s)). Radiologic Studies -   No orders to display         Medical Decision Making   I am the first provider for this patient. I reviewed the vital signs, available nursing notes, past medical history, past surgical history, family history and social history. Vital Signs-Reviewed the patient's vital signs. Records Reviewed: Nursing Notes and Old Medical Records (Time of Review: 1:24 PM)    ED Course: Progress Notes, Reevaluation, and Consults:  1:24 PM  Reviewed plan with patient. Discussed need for close outpatient follow-up this week for reassessment. Discussed strict return precautions, including dysuria, hematuria, or any other medical concerns. Discussed importance of close [facility] for further screening, including HIV and syphilis. Pt in agreement with plan. Provider Notes (Medical Decision Making): 40-year-old male who presents to the ED for STI testing and treatment. Cultures sent and pending. Treated empirically. Stable for discharge with close outpatient follow-up with health department for further screening    Diagnosis     Clinical Impression:   1. Exposure to STD        Disposition: home     Follow-up Information     Follow up With Specialties Details Why [facility]    [facility] EMERGENCY DEPT Emergency Medicine  If symptoms worsen [address] [address]  [phone]           Patient's Medications   Start Taking    DOXYCYCLINE (VIBRA-TABS) 100 MG TABLET    Take 1 Tablet by mouth two (2) times a day for 7 days. Continue Taking    No medications on file   These Medications have changed    No medications on file   Stop Taking    ONDANSETRON (ZOFRAN ODT) 4 MG DISINTEGRATING TABLET    Take 1-2 tablets every 6-8 hours as needed for nausea and vomiting. ONDANSETRON HCL (ZOFRAN) 4 MG TABLET    Take 1 Tab by mouth every eight (8) hours as needed for Nausea. Dictation disclaimer:  Please note that this dictation was completed with [employer], the computer voice recognition software. Quite often unanticipated grammatical, syntax, homophones, and other interpretive errors are inadvertently transcribed by the computer software. Please disregard these errors. Please excuse any errors that have escaped final proofreading.

## 2021-08-27 NOTE — DISCHARGE INSTRUCTIONS
You were treated for exposure to a possible STD. No sexual activity for the next 2 weeks. Any sexual partner(s) should be checked for STD's as well. You should follow-up with the [facility] for any further testing for STDs, including HIV.     Address: [address]  Telephone: [phone]

## 2021-08-27 NOTE — ED TRIAGE NOTES
Pt states she was called by a partner to be tested for STD's. Pt denies any urinary symptoms and states he was not told what STD's to test for.

## 2021-08-30 LAB
C TRACH RRNA SPEC QL NAA+PROBE: NEGATIVE
N GONORRHOEA RRNA SPEC QL NAA+PROBE: NEGATIVE
SPECIMEN SOURCE: ABNORMAL
T VAGINALIS RRNA VAG QL NAA+PROBE: POSITIVE

## 2021-09-02 RX ORDER — METRONIDAZOLE 500 MG/1
2000 TABLET ORAL
Qty: 4 TABLET | Refills: 0 | Status: SHIPPED | OUTPATIENT
Start: 2021-09-02 | End: 2021-09-02

## 2021-09-02 NOTE — PROGRESS NOTES
Called and reviewed results with patient. Sent prescription for Flagyl to pharmacy. Discussed importance of close outpatient follow-up with [facility] for further assessment and screening. Discussed importance of informing partners of positive result so they may seek treatment too. Discussed importance of abstinence x14 days.   Patient verbalized understanding, will  antibiotic today

## 2022-03-12 ENCOUNTER — HOSPITAL ENCOUNTER (EMERGENCY)
Age: 37
Discharge: HOME | End: 2022-03-12
Attending: STUDENT IN AN ORGANIZED HEALTH CARE EDUCATION/TRAINING PROGRAM
Payer: MEDICAID

## 2022-03-12 ENCOUNTER — APPOINTMENT (OUTPATIENT)
Dept: GENERAL RADIOLOGY | Age: 37
End: 2022-03-12
Attending: PHYSICIAN ASSISTANT
Payer: MEDICAID

## 2022-03-12 VITALS
TEMPERATURE: 99.5 F | HEIGHT: 73 IN | SYSTOLIC BLOOD PRESSURE: 131 MMHG | RESPIRATION RATE: 17 BRPM | HEART RATE: 80 BPM | WEIGHT: 168 LBS | DIASTOLIC BLOOD PRESSURE: 100 MMHG | BODY MASS INDEX: 22.26 KG/M2 | OXYGEN SATURATION: 100 %

## 2022-03-12 DIAGNOSIS — S39.012A STRAIN OF LUMBAR REGION, INITIAL ENCOUNTER: Primary | ICD-10-CM

## 2022-03-12 DIAGNOSIS — R03.0 ELEVATED BLOOD PRESSURE READING: ICD-10-CM

## 2022-03-12 PROCEDURE — 99283 EMERGENCY DEPT VISIT LOW MDM: CPT

## 2022-03-12 PROCEDURE — 72100 X-RAY EXAM L-S SPINE 2/3 VWS: CPT

## 2022-03-12 PROCEDURE — 74011250637 HC RX REV CODE- 250/637: Performed by: PHYSICIAN ASSISTANT

## 2022-03-12 RX ORDER — CYCLOBENZAPRINE HCL 5 MG
10 TABLET ORAL 3 TIMES DAILY
Qty: 12 TABLET | Refills: 0 | Status: SHIPPED | OUTPATIENT
Start: 2022-03-12

## 2022-03-12 RX ORDER — LIDOCAINE 50 MG/G
PATCH TOPICAL
Qty: 30 EACH | Refills: 0 | Status: SHIPPED | OUTPATIENT
Start: 2022-03-12

## 2022-03-12 RX ORDER — ACETAMINOPHEN 500 MG
1000 TABLET ORAL
Status: COMPLETED | OUTPATIENT
Start: 2022-03-12 | End: 2022-03-12

## 2022-03-12 RX ORDER — ACETAMINOPHEN 500 MG
1000 TABLET ORAL
Qty: 20 TABLET | Refills: 0 | Status: SHIPPED | OUTPATIENT
Start: 2022-03-12

## 2022-03-12 RX ADMIN — ACETAMINOPHEN 1000 MG: 500 TABLET ORAL at 23:24

## 2022-03-12 NOTE — Clinical Note
[facility] EMERGENCY DEPT  [address] [address] [phone]    Work/School Note    Date: 3/12/2022    To Whom It May concern:    Pierce Perdue was seen and treated today in the emergency room by the following provider(s):  Attending Provider: Dolly Flores MD  Physician Assistant: John Dawkins. Pierce Perdue is excused from work/school on 3/12/2022 through 3/14/2022. He is medically clear to return to work/school on 3/15/2022.          Sincerely,          Daryl Richardson PA

## 2022-03-12 NOTE — Clinical Note
[address] [place] [address] [facility] EM[facility]  [address] [address] [phone]    Work/School Note    Date: 3/12/2022    To Whom It May concern:    Az Rollins was seen and treated today in the emergency room by the following provider(s):  Attending Provider: Connor Melissa MD  Physician Assistant: Isadora Parekh, [facility] [address]. Az Rollins is excused from work/school on 3/12/2022 through 3/14/2022. He is medically clear to return to work/school on 3/15/2022.          Sincerely,          BETSEY Sousa

## 2022-03-13 NOTE — ED PROVIDER NOTES
EMERGENCY DEPARTMENT HISTORY AND PHYSICAL EXAM    10:59 PM      Date: 3/12/2022  Patient Name: Leyla Araiza    History of Presenting Illness     Chief Complaint   Patient presents with   [facility] Fall    Back Pain       History Provided By: Patient    Additional History (Context): Leyla Araiza is a 43 y.o. male with noted PMh who presents with c/o acute low back pain after a trip and fall that occurred last night. Pt notes he slipped and fell down 4 stairs. Notes pain is worse with movement. Notes he did not take any medication for symptoms PTA. Denies head injury, LOC, neck pain, abdominal pain, n/v, numbness/tingling, weakness, leg pain.       PCP: None    Current Outpatient Medications   Medication Sig Dispense Refill    cyclobenzaprine (FLEXERIL) 5 mg tablet Take 2 Tablets by mouth three (3) times daily. 12 Tablet 0    lidocaine (Lidoderm) 5 % Apply patch to the affected area for 12 hours a day and remove for 12 hours a day. 30 Each 0    acetaminophen (Tylenol Extra Strength) 500 mg tablet Take 2 Tablets by mouth every six (6) hours as needed for Pain. 20 Tablet 0       Past History     Past Medical History:  Past Medical History:   Diagnosis Date    Abdominal pain     Gastroenteritis     H/O emphysema     Hypokalemia     Syncope and collapse        Past Surgical History:  No past surgical history on file. Family History:  No family history on file. Social History:  Social History     Tobacco Use    Smoking status: Never Smoker    Smokeless tobacco: Never Used   Substance Use Topics    Alcohol use: No    Drug use: Yes     Types: Marijuana     Comment: States daily marijuana use. Allergies: Allergies   Allergen Reactions    Ibuprofen Hives         Review of Systems       Review of Systems   Constitutional: Negative for chills and fever. Respiratory: Negative for shortness of breath. Cardiovascular: Negative for chest pain.    Gastrointestinal: Negative for abdominal pain, nausea and vomiting. Musculoskeletal: Positive for back pain. Skin: Negative for rash. Neurological: Negative for weakness. All other systems reviewed and are negative. Physical Exam     Visit Vitals  BP (!) 131/100 (BP 1 Location: Left upper arm, BP Patient Position: At rest)   Pulse 80   Temp 99.5 °F (37.5 °C)   Resp 17   Ht 6' 1\" (1.854 m)   Wt 76.2 kg (168 lb)   SpO2 100%   BMI 22.16 kg/m²         Physical Exam  Vitals and nursing note reviewed. Constitutional:       General: He is not in acute distress. Appearance: Normal appearance. He is well-developed. He is not ill-appearing, toxic-appearing or diaphoretic. HENT:      Head: Normocephalic and atraumatic. No raccoon eyes or abrasion. Jaw: There is normal jaw occlusion. Cardiovascular:      Rate and Rhythm: Normal rate and regular rhythm. Heart sounds: Normal heart sounds. No murmur heard. No friction rub. No gallop. Pulmonary:      Effort: Pulmonary effort is normal. No respiratory distress. Breath sounds: Normal breath sounds. No wheezing or rales. Musculoskeletal:         General: Normal range of motion. Cervical back: Normal, normal range of motion and neck supple. Thoracic back: Normal.      Lumbar back: Bony tenderness (moderate midline tenderness without edema/ecchymosis/erythema) present. No swelling, edema, deformity or signs of trauma. Normal range of motion. Skin:     General: Skin is warm. Findings: No rash. Neurological:      Mental Status: He is alert. Cranial Nerves: No cranial nerve deficit. Sensory: No sensory deficit. Motor: No weakness. Gait: Gait normal.      Comments: Ambulatory with steady gait            Diagnostic Study Results     Labs -  No results found for this or any previous visit (from the past 12 hour(s)). Radiologic Studies -   XR SPINE LUMB 2 OR 3 V   Final Result      No acute fracture identified. Trace L4-L5 retrolisthesis. Medical Decision Making   I am the first provider for this patient. I reviewed the vital signs, available nursing notes, past medical history, past surgical history, family history and social history. Vital Signs-Reviewed the patient's vital signs. Records Reviewed: Nursing Notes and Old Medical Records (Time of Review: 10:59 PM)    ED Course: Progress Notes, Reevaluation, and Consults:  10:59 PM  Reviewed results and plan with patient. Discussed need for close outpatient follow-up this week for reassessment. Discussed strict return precautions, including leg weakness, numbness, or any other medical concerns. One or more blood pressure readings were noted elevated during the patient's presentation in the emergency department today. This abnormal reading has been cited in the patients' diagnosis, and they have been encouraged to follow up with their primary care physician, or referred to a consultant for further evaluation and treatment. Provider Notes (Medical Decision Making): 68-year-old male who presents to ED due to acute low back pain after a fall last night. No head injury or loss of consciousness. No ecchymosis, edema, deformity to back. Ambulatory with steady gait. X-ray without evidence of acute process. Patient stable for discharge with symptomatic management, close outpatient follow-up for further assessment. Strict return precautions provided. Diagnosis     Clinical Impression:   1. Strain of lumbar region, initial encounter    2.  Elevated blood pressure reading        Disposition: home     Follow-up Information     Follow up With Specialties Details Why [address] [facility] EMERGENCY DEPT Emergency Medicine  If symptoms worsen [address] [address]  Yobani [address]  Schedule an appointment as soon as possible for a visit   Rufino Johnson [address]  [address]  [phone]           Patient's Medications Start Taking    ACETAMINOPHEN (TYLENOL EXTRA STRENGTH) 500 MG TABLET    Take 2 Tablets by mouth every six (6) hours as needed for Pain. CYCLOBENZAPRINE (FLEXERIL) 5 MG TABLET    Take 2 Tablets by mouth three (3) times daily. LIDOCAINE (LIDODERM) 5 %    Apply patch to the affected area for 12 hours a day and remove for 12 hours a day. Continue Taking    No medications on file   These Medications have changed    No medications on file   Stop Taking    No medications on file       Dictation disclaimer:  Please note that this dictation was completed with [employer] the [employer] voice recognition software. Quite often unanticipated grammatical, syntax, homophones, and other interpretive errors are inadvertently transcribed by the computer software. Please disregard these errors. Please excuse any errors that have escaped final proofreading.

## 2022-03-13 NOTE — ED TRIAGE NOTES
Pt to ED with complaints of right lower back pan that started last night after falling down 4 stairs, pt denies LOC, denies hitting head, states ain started last night and worsened today.

## 2022-11-08 ENCOUNTER — HOSPITAL ENCOUNTER (EMERGENCY)
Age: 37
Discharge: HOME | End: 2022-11-09
Attending: STUDENT IN AN ORGANIZED HEALTH CARE EDUCATION/TRAINING PROGRAM
Payer: MEDICAID

## 2022-11-08 VITALS
DIASTOLIC BLOOD PRESSURE: 79 MMHG | WEIGHT: 160 LBS | HEART RATE: 72 BPM | OXYGEN SATURATION: 99 % | HEIGHT: 73 IN | RESPIRATION RATE: 18 BRPM | SYSTOLIC BLOOD PRESSURE: 109 MMHG | TEMPERATURE: 98.6 F | BODY MASS INDEX: 21.2 KG/M2

## 2022-11-08 DIAGNOSIS — K29.90 GASTRITIS AND DUODENITIS: Primary | ICD-10-CM

## 2022-11-08 LAB
ALBUMIN SERPL-MCNC: 4.7 G/DL (ref 3.4–5)
ALBUMIN/GLOB SERPL: 1 {RATIO} (ref 0.8–1.7)
ALP SERPL-CCNC: 67 U/L (ref 45–117)
ALT SERPL-CCNC: 23 U/L (ref 16–61)
ANION GAP SERPL CALC-SCNC: 7 MMOL/L (ref 3–18)
AST SERPL-CCNC: 23 U/L (ref 10–38)
BASOPHILS # BLD: 0 K/UL (ref 0–0.1)
BASOPHILS NFR BLD: 0 % (ref 0–2)
BILIRUB SERPL-MCNC: 0.8 MG/DL (ref 0.2–1)
BUN SERPL-MCNC: 20 MG/DL (ref 7–18)
BUN/CREAT SERPL: 14 (ref 12–20)
CALCIUM SERPL-MCNC: 9.6 MG/DL (ref 8.5–10.1)
CHLORIDE SERPL-SCNC: 98 MMOL/L (ref 100–111)
CO2 SERPL-SCNC: 29 MMOL/L (ref 21–32)
CREAT SERPL-MCNC: 1.43 MG/DL (ref 0.6–1.3)
DIFFERENTIAL METHOD BLD: NORMAL
EOSINOPHIL # BLD: 0 K/UL (ref 0–0.4)
EOSINOPHIL NFR BLD: 0 % (ref 0–5)
ERYTHROCYTE [DISTWIDTH] IN BLOOD BY AUTOMATED COUNT: 12.5 % (ref 11.6–14.5)
ETHANOL SERPL-MCNC: 5 MG/DL (ref 0–3)
FLUAV AG NPH QL IA: NEGATIVE
FLUBV AG NOSE QL IA: NEGATIVE
GLOBULIN SER CALC-MCNC: 4.9 G/DL (ref 2–4)
GLUCOSE SERPL-MCNC: 97 MG/DL (ref 74–99)
HCT VFR BLD AUTO: 46.8 % (ref 36–48)
HGB BLD-MCNC: 15.9 G/DL (ref 13–16)
IMM GRANULOCYTES # BLD AUTO: 0 K/UL (ref 0–0.04)
IMM GRANULOCYTES NFR BLD AUTO: 0 % (ref 0–0.5)
LIPASE SERPL-CCNC: 128 U/L (ref 73–393)
LYMPHOCYTES # BLD: 3.1 K/UL (ref 0.9–3.6)
LYMPHOCYTES NFR BLD: 38 % (ref 21–52)
MAGNESIUM SERPL-MCNC: 2.4 MG/DL (ref 1.6–2.6)
MCH RBC QN AUTO: 31.8 PG (ref 24–34)
MCHC RBC AUTO-ENTMCNC: 34 G/DL (ref 31–37)
MCV RBC AUTO: 93.6 FL (ref 78–100)
MONOCYTES # BLD: 0.4 K/UL (ref 0.05–1.2)
MONOCYTES NFR BLD: 5 % (ref 3–10)
NEUTS SEG # BLD: 4.5 K/UL (ref 1.8–8)
NEUTS SEG NFR BLD: 56 % (ref 40–73)
NRBC # BLD: 0 K/UL (ref 0–0.01)
NRBC BLD-RTO: 0 PER 100 WBC
PLATELET # BLD AUTO: 248 K/UL (ref 135–420)
PMV BLD AUTO: 9.9 FL (ref 9.2–11.8)
POTASSIUM SERPL-SCNC: 4.4 MMOL/L (ref 3.5–5.5)
PROT SERPL-MCNC: 9.6 G/DL (ref 6.4–8.2)
RBC # BLD AUTO: 5 M/UL (ref 4.35–5.65)
SODIUM SERPL-SCNC: 134 MMOL/L (ref 136–145)
WBC # BLD AUTO: 8 K/UL (ref 4.6–13.2)

## 2022-11-08 PROCEDURE — 85025 COMPLETE CBC W/AUTO DIFF WBC: CPT

## 2022-11-08 PROCEDURE — 96375 TX/PRO/DX INJ NEW DRUG ADDON: CPT

## 2022-11-08 PROCEDURE — 87804 INFLUENZA ASSAY W/OPTIC: CPT

## 2022-11-08 PROCEDURE — 96374 THER/PROPH/DIAG INJ IV PUSH: CPT

## 2022-11-08 PROCEDURE — 83735 ASSAY OF MAGNESIUM: CPT

## 2022-11-08 PROCEDURE — 74011000250 HC RX REV CODE- 250: Performed by: STUDENT IN AN ORGANIZED HEALTH CARE EDUCATION/TRAINING PROGRAM

## 2022-11-08 PROCEDURE — 96376 TX/PRO/DX INJ SAME DRUG ADON: CPT

## 2022-11-08 PROCEDURE — 82077 ASSAY SPEC XCP UR&BREATH IA: CPT

## 2022-11-08 PROCEDURE — 80053 COMPREHEN METABOLIC PANEL: CPT

## 2022-11-08 PROCEDURE — 74011250636 HC RX REV CODE- 250/636: Performed by: STUDENT IN AN ORGANIZED HEALTH CARE EDUCATION/TRAINING PROGRAM

## 2022-11-08 PROCEDURE — 83690 ASSAY OF LIPASE: CPT

## 2022-11-08 PROCEDURE — 99284 EMERGENCY DEPT VISIT MOD MDM: CPT

## 2022-11-08 RX ORDER — ONDANSETRON 2 MG/ML
4 INJECTION INTRAMUSCULAR; INTRAVENOUS ONCE
Status: COMPLETED | OUTPATIENT
Start: 2022-11-08 | End: 2022-11-08

## 2022-11-08 RX ADMIN — FAMOTIDINE 20 MG: 10 INJECTION, SOLUTION INTRAVENOUS at 23:13

## 2022-11-08 RX ADMIN — ONDANSETRON 4 MG: 2 INJECTION INTRAMUSCULAR; INTRAVENOUS at 23:13

## 2022-11-08 RX ADMIN — SODIUM CHLORIDE 1000 ML: 9 INJECTION, SOLUTION INTRAVENOUS at 23:10

## 2022-11-08 NOTE — Clinical Note
[address] [facility] EMERGENCY DEPT  [address] [id number] [phone]    Work/School Note    Date: 11/8/2022    To Whom It May concern:    Josiah Crum was seen and treated today in the emergency room by the following provider(s):  Attending Provider: Colleen Sanchez MD.      Josiah Crum is excused from work/school on 11/09/22 and 11/10/22. He is medically clear to return to work/school on 11/11/2022.        Sincerely,          Rhianna Peterson MD

## 2022-11-09 PROCEDURE — 96376 TX/PRO/DX INJ SAME DRUG ADON: CPT

## 2022-11-09 PROCEDURE — 74011250636 HC RX REV CODE- 250/636: Performed by: STUDENT IN AN ORGANIZED HEALTH CARE EDUCATION/TRAINING PROGRAM

## 2022-11-09 RX ORDER — FAMOTIDINE 20 MG/1
20 TABLET, FILM COATED ORAL 2 TIMES DAILY
Qty: 30 TABLET | Refills: 0 | Status: SHIPPED | OUTPATIENT
Start: 2022-11-09

## 2022-11-09 RX ORDER — ONDANSETRON 2 MG/ML
4 INJECTION INTRAMUSCULAR; INTRAVENOUS ONCE
Status: COMPLETED | OUTPATIENT
Start: 2022-11-09 | End: 2022-11-09

## 2022-11-09 RX ORDER — ONDANSETRON 4 MG/1
4 TABLET, ORALLY DISINTEGRATING ORAL
Qty: 20 TABLET | Refills: 0 | Status: SHIPPED | OUTPATIENT
Start: 2022-11-09

## 2022-11-09 RX ADMIN — ONDANSETRON 4 MG: 2 INJECTION INTRAMUSCULAR; INTRAVENOUS at 00:46

## 2022-11-09 NOTE — ED PROVIDER NOTES
HPI   Patient is a 78-year-old male who presents with a 2-day history of vomiting. He states that he went out to a bar on Saturday night was drinking and ate some food and then around 2 AM started having multiple bouts of vomiting. He states that over the last 2 days has been unable to keep anything down and has had greater than 10 bouts of vomiting. He is not sure if he is passing gas has not had a bowel movement since then. Took some Tums and milk of magnesia without any improvement in his symptoms. Denies any specific abdominal pain. Denies any blood coming from anywhere. Denies any recent fever, sweats or chills. Denies any sick contacts or COVID exposure. Denies any runny nose or sore throat. His main concern is his fluid status. Past Medical History:   Diagnosis Date    Abdominal pain     Gastroenteritis     H/O emphysema     Hypokalemia     Syncope and collapse        History reviewed. No pertinent surgical history. History reviewed. No pertinent family history. Social History     Socioeconomic History    Marital status:      Spouse name: Not on file    Number of children: Not on file    Years of education: Not on file    Highest education level: Not on file   Occupational History    Not on file   Tobacco Use    Smoking status: Never    Smokeless tobacco: Never   Substance and Sexual Activity    Alcohol use: Yes     Comment: every weekend    Drug use: Yes     Types: Marijuana     Comment: States daily marijuana use.      Sexual activity: Not on file   Other Topics Concern    Not on file   Social History Narrative    Not on file     Social Determinants of Health     Financial Resource Strain: Not on file   Food Insecurity: Not on file   Transportation Needs: Not on file   Physical Activity: Not on file   Stress: Not on file   Social Connections: Not on file   Intimate Partner Violence: Not on file   Housing Stability: Not on file         ALLERGIES: Ibuprofen    Review of Systems  Constitutional: No fever  HENT: No ear pain  Eyes: No change in vision  Respiratory: No SOB  Cardio: No chest pain  GI: No blood in stool  : No hematuria  MSK: No back pain  Skin: No rashes  Neuro: No headache    Vitals:    11/08/22 2159   BP: 101/77   Pulse: 72   Resp: 18   Temp: 98.6 °F (37 °C)   SpO2: 99%   Weight: 72.6 kg (160 lb)   Height: 6' 1\" (1.854 m)            Physical Exam   General: No acute distress  Head: Normocephalic, atraumatic  Psych: Cooperative and alert  Eyes: No scleral icterus, normal conjunctiva  ENT: Moist oral mucosa  Neck: Supple  CV: Regular rate and rhythm, no pitting edema, palpable radial pulses  Pulm: Clear breath sounds bilaterally without any wheezing or rhonchi, normal respiratory rate  GI: Normal bowel sounds, soft, non-tender  MSK: Moves all four extremities  Skin: No rashes  Neuro: Alert and conversive    MDM    Patient is a 47-year-old male who presents with vomiting. Patient has a benign abdominal exam is most likely suffering from gastritis or some sort of viral gastroenteritis. Patient be treated with fluids and Zofran however will send off blood work to make sure we are not missing something specific. I do not think a CT scan is medically merited at this time as he has a benign abdominal exam and otherwise appears well. Is not distended or tympanic and I do not suspect obstruction at this time. CBC, CMP, lipase, alcohol, flu are all within normal limits    On reexamination patient states he still feels little nauseous but does feel better. He is given additional round of Zofran. After this patient is able to tolerate p.o. Patient stable for discharge at this time. Patient is in agreement with the plan to be discharged at this time. All the patient's questions were answered. Patient was given written instructions on the diagnosis, and states understanding of the plan moving forward.   We did discuss important signs and symptoms that should prompt quick return to the emergency department. Disposition: Patient was discharged home in stable condition.   They will follow up with their primary care physician    Prescriptions: Zofran, famotidine    Diagnosis: Acute gastritis    Procedures

## 2022-11-09 NOTE — ED TRIAGE NOTES
Pt c/o vomiting since Sunday 0300 after eating out. States he had a couple of drinks as well. Denies diarrhea or fevers. Pt reports h/o gastritis.

## 2023-12-11 ENCOUNTER — HOSPITAL ENCOUNTER (OUTPATIENT)
Facility: HOSPITAL | Age: 38
Discharge: HOME | End: 2023-12-14
Payer: MEDICAID

## 2023-12-11 DIAGNOSIS — M25.512 LEFT SHOULDER PAIN, UNSPECIFIED CHRONICITY: ICD-10-CM

## 2023-12-11 DIAGNOSIS — M25.519 SHOULDER PAIN, UNSPECIFIED CHRONICITY, UNSPECIFIED LATERALITY: ICD-10-CM

## 2023-12-11 LAB — SENTARA SPECIMEN COLLECTION: NORMAL

## 2023-12-11 PROCEDURE — 73030 X-RAY EXAM OF SHOULDER: CPT

## 2024-02-15 ENCOUNTER — HOSPITAL ENCOUNTER (EMERGENCY)
Facility: HOSPITAL | Age: 39
Discharge: HOME | End: 2024-02-15
Attending: EMERGENCY MEDICINE
Payer: MEDICAID

## 2024-02-15 ENCOUNTER — APPOINTMENT (OUTPATIENT)
Facility: HOSPITAL | Age: 39
End: 2024-02-15
Payer: MEDICAID

## 2024-02-15 VITALS
HEART RATE: 58 BPM | TEMPERATURE: 97.6 F | SYSTOLIC BLOOD PRESSURE: 117 MMHG | RESPIRATION RATE: 18 BRPM | OXYGEN SATURATION: 100 % | DIASTOLIC BLOOD PRESSURE: 74 MMHG | WEIGHT: 160 LBS | HEIGHT: 73 IN | BODY MASS INDEX: 21.2 KG/M2

## 2024-02-15 DIAGNOSIS — M54.40 LOW BACK PAIN WITH SCIATICA, SCIATICA LATERALITY UNSPECIFIED, UNSPECIFIED BACK PAIN LATERALITY, UNSPECIFIED CHRONICITY: Primary | ICD-10-CM

## 2024-02-15 PROCEDURE — 6370000000 HC RX 637 (ALT 250 FOR IP): Performed by: EMERGENCY MEDICINE

## 2024-02-15 PROCEDURE — 72100 X-RAY EXAM L-S SPINE 2/3 VWS: CPT

## 2024-02-15 PROCEDURE — 99283 EMERGENCY DEPT VISIT LOW MDM: CPT

## 2024-02-15 RX ORDER — LIDOCAINE 50 MG/G
1 PATCH TOPICAL DAILY
Qty: 10 PATCH | Refills: 0 | Status: SHIPPED | OUTPATIENT
Start: 2024-02-15 | End: 2024-02-25

## 2024-02-15 RX ORDER — OXYCODONE HYDROCHLORIDE AND ACETAMINOPHEN 5; 325 MG/1; MG/1
1 TABLET ORAL EVERY 6 HOURS PRN
Qty: 12 TABLET | Refills: 0 | Status: SHIPPED | OUTPATIENT
Start: 2024-02-15 | End: 2024-02-18

## 2024-02-15 RX ORDER — LIDOCAINE 4 G/G
2 PATCH TOPICAL
Status: DISCONTINUED | OUTPATIENT
Start: 2024-02-15 | End: 2024-02-15 | Stop reason: HOSPADM

## 2024-02-15 RX ORDER — LIDOCAINE 50 MG/G
1 PATCH TOPICAL DAILY
Status: DISCONTINUED | OUTPATIENT
Start: 2024-02-15 | End: 2024-02-15

## 2024-02-15 NOTE — ED TRIAGE NOTES
Pt ambulatory to ED with steady gait and A&O x 4 c/o back pain since Tuesday. 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 Resulting in patient falling off step stool with box. Denies head trauma, neck trauma or loc. Pt now reports lower back pain with pain radiating down left leg.

## 2024-02-15 NOTE — ED PROVIDER NOTES
`[facility] EMERGENCY DEPT  eMERGENCY dEPARTMENT eNCOUnter      Pt Name: Bill Mclaughlin  MRN: [id number]  Birthdate 1985 of evaluation: 2/15/2024  Provider:Nikolas Duarte MD    CHIEF COMPLAINT       HPI    Bill Mclaughlin is a 38 y.o. male  c/o having lower back pain after lifting a heavy box.  He states he and another  employee was carrying a heavy box.  The other employee drop the box that caused him to injury his lower back.  He later developed severe lower back pain.    ROS    Review of Systems   Constitutional:  Positive for activity change.   HENT: Negative.     Respiratory: Negative.     Cardiovascular: Negative.    Gastrointestinal: Negative.    Genitourinary:  Decreased urine volume: (+) tender over his lower back.   Musculoskeletal:  Positive for back pain.       Except as noted above the remainder of the review of systems was reviewed and negative.       PAST MEDICAL HISTORY     Past Medical History:   Diagnosis Date    Abdominal pain     Gastroenteritis     H/O emphysema (HCC)     Hypokalemia     Syncope and collapse          SURGICAL HISTORY     History reviewed. No pertinent surgical history.      CURRENTMEDICATIONS       Previous Medications    FAMOTIDINE (PEPCID) 20 MG TABLET    Take 20 mg by mouth 2 times daily    ONDANSETRON (ZOFRAN-ODT) 4 MG DISINTEGRATING TABLET    Take 4 mg by mouth every 4 hours as needed       ALLERGIES     Ibuprofen    FAMILY HISTORY     History reviewed. No pertinent family history.       SOCIAL HISTORY       Social History     Socioeconomic History    Marital status:      Spouse name: None    Number of children: None    Years of education: None    Highest education level: None   Tobacco Use    Smoking status: Never    Smokeless tobacco: Never   Substance and Sexual Activity    Alcohol use: Yes    Drug use: Yes     Types: Marijuana (Weed)         PHYSICAL EXAM       ED Triage Vitals [02/15/24 0244]   BP Temp Temp Source Pulse Respirations SpO2 Height Weight - 
with a voice recognitionprogram.  Efforts were made to edit the dictations but occasionally words are mis-transcribed.)    Nikolas Duarte MD(electronically signed)  Attending Emergency Physician

## 2024-02-19 ENCOUNTER — HOSPITAL ENCOUNTER (EMERGENCY)
Facility: HOSPITAL | Age: 39
Discharge: HOME | End: 2024-02-19
Attending: EMERGENCY MEDICINE
Payer: MEDICAID

## 2024-02-19 VITALS
HEIGHT: 73 IN | TEMPERATURE: 98.7 F | WEIGHT: 160 LBS | SYSTOLIC BLOOD PRESSURE: 135 MMHG | RESPIRATION RATE: 24 BRPM | BODY MASS INDEX: 21.2 KG/M2 | DIASTOLIC BLOOD PRESSURE: 93 MMHG | OXYGEN SATURATION: 100 % | HEART RATE: 53 BPM

## 2024-02-19 DIAGNOSIS — K29.90 GASTRITIS AND DUODENITIS: Primary | ICD-10-CM

## 2024-02-19 LAB
ALBUMIN SERPL-MCNC: 4.7 G/DL (ref 3.4–5)
ALBUMIN/GLOB SERPL: 1.2 (ref 0.8–1.7)
ALP SERPL-CCNC: 64 U/L (ref 45–117)
ALT SERPL-CCNC: 64 U/L (ref 16–61)
ANION GAP SERPL CALC-SCNC: 9 MMOL/L (ref 3–18)
AST SERPL-CCNC: 68 U/L (ref 10–38)
BASOPHILS # BLD: 0 K/UL (ref 0–0.1)
BASOPHILS NFR BLD: 0 % (ref 0–2)
BILIRUB SERPL-MCNC: 0.8 MG/DL (ref 0.2–1)
BUN SERPL-MCNC: 23 MG/DL (ref 7–18)
BUN/CREAT SERPL: 18 (ref 12–20)
CALCIUM SERPL-MCNC: 10.1 MG/DL (ref 8.5–10.1)
CHLORIDE SERPL-SCNC: 98 MMOL/L (ref 100–111)
CO2 SERPL-SCNC: 26 MMOL/L (ref 21–32)
CREAT SERPL-MCNC: 1.29 MG/DL (ref 0.6–1.3)
DIFFERENTIAL METHOD BLD: ABNORMAL
EKG ATRIAL RATE: 57 BPM
EKG DIAGNOSIS: NORMAL
EKG P AXIS: 79 DEGREES
EKG P-R INTERVAL: 140 MS
EKG Q-T INTERVAL: 428 MS
EKG QRS DURATION: 70 MS
EKG QTC CALCULATION (BAZETT): 416 MS
EKG R AXIS: 87 DEGREES
EKG T AXIS: 73 DEGREES
EKG VENTRICULAR RATE: 57 BPM
EOSINOPHIL # BLD: 0 K/UL (ref 0–0.4)
EOSINOPHIL NFR BLD: 0 % (ref 0–5)
ERYTHROCYTE [DISTWIDTH] IN BLOOD BY AUTOMATED COUNT: 12.7 % (ref 11.6–14.5)
GLOBULIN SER CALC-MCNC: 3.8 G/DL (ref 2–4)
GLUCOSE SERPL-MCNC: 127 MG/DL (ref 74–99)
HCT VFR BLD AUTO: 42.7 % (ref 36–48)
HGB BLD-MCNC: 14.6 G/DL (ref 13–16)
IMM GRANULOCYTES # BLD AUTO: 0 K/UL (ref 0–0.04)
IMM GRANULOCYTES NFR BLD AUTO: 0 % (ref 0–0.5)
LIPASE SERPL-CCNC: 30 U/L (ref 13–75)
LYMPHOCYTES # BLD: 1.6 K/UL (ref 0.9–3.6)
LYMPHOCYTES NFR BLD: 16 % (ref 21–52)
MCH RBC QN AUTO: 32.2 PG (ref 24–34)
MCHC RBC AUTO-ENTMCNC: 34.2 G/DL (ref 31–37)
MCV RBC AUTO: 94.1 FL (ref 78–100)
MONOCYTES # BLD: 0.5 K/UL (ref 0.05–1.2)
MONOCYTES NFR BLD: 5 % (ref 3–10)
NEUTS SEG # BLD: 7.9 K/UL (ref 1.8–8)
NEUTS SEG NFR BLD: 78 % (ref 40–73)
NRBC # BLD: 0 K/UL (ref 0–0.01)
NRBC BLD-RTO: 0 PER 100 WBC
PLATELET # BLD AUTO: 238 K/UL (ref 135–420)
PMV BLD AUTO: 10.1 FL (ref 9.2–11.8)
POTASSIUM SERPL-SCNC: 4 MMOL/L (ref 3.5–5.5)
PROT SERPL-MCNC: 8.5 G/DL (ref 6.4–8.2)
RBC # BLD AUTO: 4.54 M/UL (ref 4.35–5.65)
SODIUM SERPL-SCNC: 133 MMOL/L (ref 136–145)
WBC # BLD AUTO: 10.1 K/UL (ref 4.6–13.2)

## 2024-02-19 PROCEDURE — 99284 EMERGENCY DEPT VISIT MOD MDM: CPT

## 2024-02-19 PROCEDURE — 6360000002 HC RX W HCPCS: Performed by: EMERGENCY MEDICINE

## 2024-02-19 PROCEDURE — 85025 COMPLETE CBC W/AUTO DIFF WBC: CPT

## 2024-02-19 PROCEDURE — 2580000003 HC RX 258: Performed by: EMERGENCY MEDICINE

## 2024-02-19 PROCEDURE — 93010 ELECTROCARDIOGRAM REPORT: CPT | Performed by: INTERNAL MEDICINE

## 2024-02-19 PROCEDURE — A4216 STERILE WATER/SALINE, 10 ML: HCPCS | Performed by: EMERGENCY MEDICINE

## 2024-02-19 PROCEDURE — 83690 ASSAY OF LIPASE: CPT

## 2024-02-19 PROCEDURE — 96374 THER/PROPH/DIAG INJ IV PUSH: CPT

## 2024-02-19 PROCEDURE — 80053 COMPREHEN METABOLIC PANEL: CPT

## 2024-02-19 PROCEDURE — 96361 HYDRATE IV INFUSION ADD-ON: CPT

## 2024-02-19 PROCEDURE — 93005 ELECTROCARDIOGRAM TRACING: CPT | Performed by: EMERGENCY MEDICINE

## 2024-02-19 PROCEDURE — 96375 TX/PRO/DX INJ NEW DRUG ADDON: CPT

## 2024-02-19 PROCEDURE — C9113 INJ PANTOPRAZOLE SODIUM, VIA: HCPCS | Performed by: EMERGENCY MEDICINE

## 2024-02-19 RX ORDER — ONDANSETRON 4 MG/1
4 TABLET, FILM COATED ORAL 3 TIMES DAILY PRN
Qty: 10 TABLET | Refills: 0 | Status: SHIPPED | OUTPATIENT
Start: 2024-02-19

## 2024-02-19 RX ORDER — ONDANSETRON 2 MG/ML
4 INJECTION INTRAMUSCULAR; INTRAVENOUS ONCE
Status: COMPLETED | OUTPATIENT
Start: 2024-02-19 | End: 2024-02-19

## 2024-02-19 RX ORDER — FAMOTIDINE 20 MG/1
20 TABLET, FILM COATED ORAL 2 TIMES DAILY
Qty: 10 TABLET | Refills: 0 | Status: SHIPPED | OUTPATIENT
Start: 2024-02-19 | End: 2024-02-24

## 2024-02-19 RX ORDER — 0.9 % SODIUM CHLORIDE 0.9 %
1000 INTRAVENOUS SOLUTION INTRAVENOUS ONCE
Status: COMPLETED | OUTPATIENT
Start: 2024-02-19 | End: 2024-02-19

## 2024-02-19 RX ADMIN — SODIUM CHLORIDE 1000 ML: 9 INJECTION, SOLUTION INTRAVENOUS at 06:12

## 2024-02-19 RX ADMIN — ONDANSETRON 4 MG: 2 INJECTION INTRAMUSCULAR; INTRAVENOUS at 06:12

## 2024-02-19 RX ADMIN — PANTOPRAZOLE SODIUM 40 MG: 40 INJECTION, POWDER, FOR SOLUTION INTRAVENOUS at 06:12

## 2024-02-19 ASSESSMENT — PAIN - FUNCTIONAL ASSESSMENT: PAIN_FUNCTIONAL_ASSESSMENT: NONE - DENIES PAIN

## 2024-02-19 NOTE — ED PROVIDER NOTES
reviewed the vital signs, available nursing notes, past medical history, past surgical history, family history and social history.    Patient Vitals for the past 12 hrs:   Temp Pulse Resp BP SpO2   02/19/24 0817 -- 63 21 (!) 123/59 --   02/19/24 0757 -- 53 20 (!) 157/86 100 %   02/19/24 0747 -- 56 16 (!) 142/91 100 %   02/19/24 0727 98.7 °F (37.1 °C) 57 17 (!) 145/84 98 %   02/19/24 0717 -- 55 20 (!) 127/91 99 %   02/19/24 0630 -- 50 (!) 8 123/70 100 %   02/19/24 0600 -- 70 12 (!) 143/92 100 %   02/19/24 0530 -- 54 21 (!) 139/90 99 %   02/19/24 0510 98.2 °F (36.8 °C) 75 26 -- 99 %   02/19/24 0505 -- 55 11 -- 100 %   02/19/24 0500 -- -- -- 126/86 99 %       Vital Signs-Reviewed the patient's vital signs.    Pulse Oximetry Analysis, Cardiac Monitor, 12 lead ekg:      Interpreted by the EP.      Records Reviewed: Nursing notes reviewed (Time of Review: 8:40 AM)    Procedures:   Critical Care Time: 0  If critical care time is note it is exclusive of any separately billable procedures.     Aspirin: (was aspirin given for stroke?)    Diagnosis     Disposition: DISPOSITION Decision To Discharge 02/19/2024 08:38:45 AM       DISCHARGE MEDICATIONS:  Current Discharge Medication List             Details   ondansetron (ZOFRAN) 4 MG tablet Take 1 tablet by mouth 3 times daily as needed for Nausea or Vomiting  Qty: 10 tablet, Refills: 0                Details   famotidine (PEPCID) 20 MG tablet Take 1 tablet by mouth 2 times daily for 5 days  Qty: 10 tablet, Refills: 0                Details   lidocaine (LIDODERM) 5 % Place 1 patch onto the skin daily for 10 days 12 hours on, 12 hours off.  Qty: 10 patch, Refills: 0      ondansetron (ZOFRAN-ODT) 4 MG disintegrating tablet Take 4 mg by mouth every 4 hours as needed             DISCONTINUED MEDICATIONS:  Current Discharge Medication List          PATIENT REFERRED TO:  Follow Up with:  Dia Mast, REGAN - NP  [address] [facility] [address] [phone]    In 3

## 2024-02-21 ENCOUNTER — HOSPITAL ENCOUNTER (EMERGENCY)
Facility: HOSPITAL | Age: 39
Discharge: HOME | End: 2024-02-21
Attending: STUDENT IN AN ORGANIZED HEALTH CARE EDUCATION/TRAINING PROGRAM
Payer: MEDICAID

## 2024-02-21 VITALS
HEART RATE: 64 BPM | HEIGHT: 73 IN | DIASTOLIC BLOOD PRESSURE: 79 MMHG | BODY MASS INDEX: 21.2 KG/M2 | WEIGHT: 160 LBS | RESPIRATION RATE: 17 BRPM | TEMPERATURE: 98.4 F | SYSTOLIC BLOOD PRESSURE: 124 MMHG | OXYGEN SATURATION: 99 %

## 2024-02-21 DIAGNOSIS — R53.1 GENERALIZED WEAKNESS: Primary | ICD-10-CM

## 2024-02-21 LAB
ALBUMIN SERPL-MCNC: 4.3 G/DL (ref 3.4–5)
ALBUMIN/GLOB SERPL: 1 (ref 0.8–1.7)
ALP SERPL-CCNC: 56 U/L (ref 45–117)
ALT SERPL-CCNC: 48 U/L (ref 16–61)
ANION GAP SERPL CALC-SCNC: 4 MMOL/L (ref 3–18)
AST SERPL-CCNC: 28 U/L (ref 10–38)
BASOPHILS # BLD: 0 K/UL (ref 0–0.1)
BASOPHILS NFR BLD: 1 % (ref 0–2)
BILIRUB SERPL-MCNC: 1 MG/DL (ref 0.2–1)
BUN SERPL-MCNC: 23 MG/DL (ref 7–18)
BUN/CREAT SERPL: 18 (ref 12–20)
CALCIUM SERPL-MCNC: 9.2 MG/DL (ref 8.5–10.1)
CHLORIDE SERPL-SCNC: 97 MMOL/L (ref 100–111)
CO2 SERPL-SCNC: 28 MMOL/L (ref 21–32)
CREAT SERPL-MCNC: 1.27 MG/DL (ref 0.6–1.3)
DIFFERENTIAL METHOD BLD: NORMAL
EOSINOPHIL # BLD: 0 K/UL (ref 0–0.4)
EOSINOPHIL NFR BLD: 1 % (ref 0–5)
ERYTHROCYTE [DISTWIDTH] IN BLOOD BY AUTOMATED COUNT: 11.7 % (ref 11.6–14.5)
GLOBULIN SER CALC-MCNC: 4.1 G/DL (ref 2–4)
GLUCOSE SERPL-MCNC: 92 MG/DL (ref 74–99)
HCT VFR BLD AUTO: 42.2 % (ref 36–48)
HGB BLD-MCNC: 14.7 G/DL (ref 13–16)
IMM GRANULOCYTES # BLD AUTO: 0 K/UL (ref 0–0.04)
IMM GRANULOCYTES NFR BLD AUTO: 0 % (ref 0–0.5)
LYMPHOCYTES # BLD: 2.8 K/UL (ref 0.9–3.6)
LYMPHOCYTES NFR BLD: 52 % (ref 21–52)
MAGNESIUM SERPL-MCNC: 2.4 MG/DL (ref 1.6–2.6)
MCH RBC QN AUTO: 32.3 PG (ref 24–34)
MCHC RBC AUTO-ENTMCNC: 34.8 G/DL (ref 31–37)
MCV RBC AUTO: 92.7 FL (ref 78–100)
MONOCYTES # BLD: 0.3 K/UL (ref 0.05–1.2)
MONOCYTES NFR BLD: 5 % (ref 3–10)
NEUTS SEG # BLD: 2.2 K/UL (ref 1.8–8)
NEUTS SEG NFR BLD: 41 % (ref 40–73)
NRBC # BLD: 0 K/UL (ref 0–0.01)
NRBC BLD-RTO: 0 PER 100 WBC
PLATELET # BLD AUTO: 223 K/UL (ref 135–420)
PMV BLD AUTO: 9.7 FL (ref 9.2–11.8)
POTASSIUM SERPL-SCNC: 3.8 MMOL/L (ref 3.5–5.5)
PROT SERPL-MCNC: 8.4 G/DL (ref 6.4–8.2)
RBC # BLD AUTO: 4.55 M/UL (ref 4.35–5.65)
SODIUM SERPL-SCNC: 129 MMOL/L (ref 136–145)
WBC # BLD AUTO: 5.4 K/UL (ref 4.6–13.2)

## 2024-02-21 PROCEDURE — 80053 COMPREHEN METABOLIC PANEL: CPT

## 2024-02-21 PROCEDURE — 83735 ASSAY OF MAGNESIUM: CPT

## 2024-02-21 PROCEDURE — 2580000003 HC RX 258: Performed by: HEALTH CARE PROVIDER

## 2024-02-21 PROCEDURE — 99284 EMERGENCY DEPT VISIT MOD MDM: CPT

## 2024-02-21 PROCEDURE — 85025 COMPLETE CBC W/AUTO DIFF WBC: CPT

## 2024-02-21 PROCEDURE — 96360 HYDRATION IV INFUSION INIT: CPT

## 2024-02-21 RX ORDER — SODIUM CHLORIDE, SODIUM LACTATE, POTASSIUM CHLORIDE, AND CALCIUM CHLORIDE .6; .31; .03; .02 G/100ML; G/100ML; G/100ML; G/100ML
1000 INJECTION, SOLUTION INTRAVENOUS ONCE
Status: COMPLETED | OUTPATIENT
Start: 2024-02-21 | End: 2024-02-21

## 2024-02-21 RX ADMIN — SODIUM CHLORIDE, POTASSIUM CHLORIDE, SODIUM LACTATE AND CALCIUM CHLORIDE 1000 ML: 600; 310; 30; 20 INJECTION, SOLUTION INTRAVENOUS at 14:34

## 2024-02-21 ASSESSMENT — ENCOUNTER SYMPTOMS
VOMITING: 1
DIARRHEA: 0
BLOOD IN STOOL: 0
CONSTIPATION: 0
RECTAL PAIN: 0
ANAL BLEEDING: 0
ABDOMINAL DISTENTION: 0
BACK PAIN: 0
SHORTNESS OF BREATH: 0
NAUSEA: 1
ABDOMINAL PAIN: 0

## 2024-02-21 ASSESSMENT — PAIN - FUNCTIONAL ASSESSMENT: PAIN_FUNCTIONAL_ASSESSMENT: NONE - DENIES PAIN

## 2024-02-21 NOTE — ED PROVIDER NOTES
as of 02/21/24 1531   Wed Feb 21, 2024   1517 Patient feeling better after 500 cc LR [AS]      ED Course User Index  [AS] Vamshi Apdoaca PA-C       1. Generalized weakness        DISPOSITION Decision To Discharge 02/21/2024 03:27:54 PM      Orders Placed This Encounter   Medications    lactated ringers bolus 1,000 mL          Medication List        ASK your doctor about these medications      famotidine 20 MG tablet  Commonly known as: Pepcid  Take 1 tablet by mouth 2 times daily for 5 days     lidocaine 5 %  Commonly known as: LIDODERM  Place 1 patch onto the skin daily for 10 days 12 hours on, 12 hours off.     ondansetron 4 MG disintegrating tablet  Commonly known as: ZOFRAN-ODT     ondansetron 4 MG tablet  Commonly known as: ZOFRAN  Take 1 tablet by mouth 3 times daily as needed for Nausea or Vomiting              Follow-ups:  Verna Lucero MD  [address] [facility] [address] [phone]      As needed    [facility] EMERGENCY DEPT  [address] [place] [id number] [phone]    If symptoms worsen           Vamshi Apodaca PA-C  02/21/24 1531

## 2024-02-21 NOTE — ED TRIAGE NOTES
Ambulatory pt c/o emesis x 5 days and weakness / fatigue. Seen at [facility] and given zofran and \"zantax\". Pt states \"they hung a bag of fluids but did not let me get hardly any of the bag when I looked\". Pt denies emesis at this time and reports wanting fluids

## 2024-02-22 ENCOUNTER — HOSPITAL ENCOUNTER (EMERGENCY)
Facility: HOSPITAL | Age: 39
Discharge: HOME | End: 2024-02-22
Payer: MEDICAID

## 2024-02-22 ENCOUNTER — APPOINTMENT (OUTPATIENT)
Facility: HOSPITAL | Age: 39
End: 2024-02-22
Payer: MEDICAID

## 2024-02-22 VITALS
TEMPERATURE: 98.2 F | OXYGEN SATURATION: 100 % | WEIGHT: 160 LBS | HEART RATE: 70 BPM | HEIGHT: 72 IN | DIASTOLIC BLOOD PRESSURE: 70 MMHG | SYSTOLIC BLOOD PRESSURE: 127 MMHG | BODY MASS INDEX: 21.67 KG/M2 | RESPIRATION RATE: 18 BRPM

## 2024-02-22 DIAGNOSIS — R10.30 LOWER ABDOMINAL PAIN: ICD-10-CM

## 2024-02-22 DIAGNOSIS — R11.2 NAUSEA AND VOMITING, UNSPECIFIED VOMITING TYPE: Primary | ICD-10-CM

## 2024-02-22 DIAGNOSIS — N30.00 ACUTE CYSTITIS WITHOUT HEMATURIA: ICD-10-CM

## 2024-02-22 LAB
ALBUMIN SERPL-MCNC: 4.4 G/DL (ref 3.4–5)
ALBUMIN/GLOB SERPL: 1 (ref 0.8–1.7)
ALP SERPL-CCNC: 58 U/L (ref 45–117)
ALT SERPL-CCNC: 43 U/L (ref 16–61)
ANION GAP SERPL CALC-SCNC: 7 MMOL/L (ref 3–18)
APPEARANCE UR: CLEAR
AST SERPL-CCNC: 26 U/L (ref 10–38)
BACTERIA URNS QL MICRO: NEGATIVE /HPF
BASOPHILS # BLD: 0 K/UL (ref 0–0.1)
BASOPHILS NFR BLD: 0 % (ref 0–2)
BILIRUB SERPL-MCNC: 1 MG/DL (ref 0.2–1)
BILIRUB UR QL: NEGATIVE
BUN SERPL-MCNC: 21 MG/DL (ref 7–18)
BUN/CREAT SERPL: 16 (ref 12–20)
CALCIUM SERPL-MCNC: 9.3 MG/DL (ref 8.5–10.1)
CHLORIDE SERPL-SCNC: 99 MMOL/L (ref 100–111)
CO2 SERPL-SCNC: 27 MMOL/L (ref 21–32)
COLOR UR: ABNORMAL
CREAT SERPL-MCNC: 1.31 MG/DL (ref 0.6–1.3)
DIFFERENTIAL METHOD BLD: ABNORMAL
EOSINOPHIL # BLD: 0 K/UL (ref 0–0.4)
EOSINOPHIL NFR BLD: 0 % (ref 0–5)
EPITH CASTS URNS QL MICRO: ABNORMAL /LPF (ref 0–5)
ERYTHROCYTE [DISTWIDTH] IN BLOOD BY AUTOMATED COUNT: 11.7 % (ref 11.6–14.5)
GLOBULIN SER CALC-MCNC: 4.4 G/DL (ref 2–4)
GLUCOSE SERPL-MCNC: 102 MG/DL (ref 74–99)
GLUCOSE UR STRIP.AUTO-MCNC: NEGATIVE MG/DL
HCT VFR BLD AUTO: 40.3 % (ref 36–48)
HGB BLD-MCNC: 14.4 G/DL (ref 13–16)
HGB UR QL STRIP: NEGATIVE
IMM GRANULOCYTES # BLD AUTO: 0 K/UL (ref 0–0.04)
IMM GRANULOCYTES NFR BLD AUTO: 0 % (ref 0–0.5)
KETONES UR QL STRIP.AUTO: 40 MG/DL
LEUKOCYTE ESTERASE UR QL STRIP.AUTO: ABNORMAL
LIPASE SERPL-CCNC: 25 U/L (ref 13–75)
LYMPHOCYTES # BLD: 1.5 K/UL (ref 0.9–3.6)
LYMPHOCYTES NFR BLD: 34 % (ref 21–52)
MCH RBC QN AUTO: 32.9 PG (ref 24–34)
MCHC RBC AUTO-ENTMCNC: 35.7 G/DL (ref 31–37)
MCV RBC AUTO: 92 FL (ref 78–100)
MONOCYTES # BLD: 0.2 K/UL (ref 0.05–1.2)
MONOCYTES NFR BLD: 4 % (ref 3–10)
MUCOUS THREADS URNS QL MICRO: ABNORMAL /LPF
NEUTS SEG # BLD: 2.6 K/UL (ref 1.8–8)
NEUTS SEG NFR BLD: 61 % (ref 40–73)
NITRITE UR QL STRIP.AUTO: NEGATIVE
NRBC # BLD: 0 K/UL (ref 0–0.01)
NRBC BLD-RTO: 0 PER 100 WBC
PH UR STRIP: 6.5 (ref 5–8)
PLATELET # BLD AUTO: 228 K/UL (ref 135–420)
PMV BLD AUTO: 9.9 FL (ref 9.2–11.8)
POTASSIUM SERPL-SCNC: 3.6 MMOL/L (ref 3.5–5.5)
PROT SERPL-MCNC: 8.8 G/DL (ref 6.4–8.2)
PROT UR STRIP-MCNC: ABNORMAL MG/DL
RBC # BLD AUTO: 4.38 M/UL (ref 4.35–5.65)
RBC #/AREA URNS HPF: NEGATIVE /HPF (ref 0–5)
SODIUM SERPL-SCNC: 133 MMOL/L (ref 136–145)
SP GR UR REFRACTOMETRY: 1.03 (ref 1–1.03)
UROBILINOGEN UR QL STRIP.AUTO: 4 EU/DL (ref 0.2–1)
WBC # BLD AUTO: 4.3 K/UL (ref 4.6–13.2)
WBC URNS QL MICRO: ABNORMAL /HPF (ref 0–4)

## 2024-02-22 PROCEDURE — 2580000003 HC RX 258: Performed by: NURSE PRACTITIONER

## 2024-02-22 PROCEDURE — 96374 THER/PROPH/DIAG INJ IV PUSH: CPT

## 2024-02-22 PROCEDURE — 99285 EMERGENCY DEPT VISIT HI MDM: CPT

## 2024-02-22 PROCEDURE — 6360000004 HC RX CONTRAST MEDICATION: Performed by: NURSE PRACTITIONER

## 2024-02-22 PROCEDURE — 6360000002 HC RX W HCPCS: Performed by: NURSE PRACTITIONER

## 2024-02-22 PROCEDURE — 96361 HYDRATE IV INFUSION ADD-ON: CPT

## 2024-02-22 PROCEDURE — 80053 COMPREHEN METABOLIC PANEL: CPT

## 2024-02-22 PROCEDURE — 74177 CT ABD & PELVIS W/CONTRAST: CPT

## 2024-02-22 PROCEDURE — 83690 ASSAY OF LIPASE: CPT

## 2024-02-22 PROCEDURE — 85025 COMPLETE CBC W/AUTO DIFF WBC: CPT

## 2024-02-22 PROCEDURE — 2500000003 HC RX 250 WO HCPCS: Performed by: NURSE PRACTITIONER

## 2024-02-22 PROCEDURE — 94761 N-INVAS EAR/PLS OXIMETRY MLT: CPT

## 2024-02-22 PROCEDURE — 96375 TX/PRO/DX INJ NEW DRUG ADDON: CPT

## 2024-02-22 PROCEDURE — 81001 URINALYSIS AUTO W/SCOPE: CPT

## 2024-02-22 PROCEDURE — A4216 STERILE WATER/SALINE, 10 ML: HCPCS | Performed by: NURSE PRACTITIONER

## 2024-02-22 RX ORDER — 0.9 % SODIUM CHLORIDE 0.9 %
1000 INTRAVENOUS SOLUTION INTRAVENOUS ONCE
Status: COMPLETED | OUTPATIENT
Start: 2024-02-22 | End: 2024-02-22

## 2024-02-22 RX ORDER — DOXYCYCLINE HYCLATE 100 MG
100 TABLET ORAL 2 TIMES DAILY
Qty: 14 TABLET | Refills: 0 | Status: SHIPPED | OUTPATIENT
Start: 2024-02-22 | End: 2024-02-29

## 2024-02-22 RX ORDER — ONDANSETRON 4 MG/1
4 TABLET, ORALLY DISINTEGRATING ORAL 3 TIMES DAILY PRN
Qty: 21 TABLET | Refills: 0 | Status: SHIPPED | OUTPATIENT
Start: 2024-02-22

## 2024-02-22 RX ORDER — ONDANSETRON 2 MG/ML
4 INJECTION INTRAMUSCULAR; INTRAVENOUS ONCE
Status: COMPLETED | OUTPATIENT
Start: 2024-02-22 | End: 2024-02-22

## 2024-02-22 RX ADMIN — FAMOTIDINE 20 MG: 10 INJECTION INTRAVENOUS at 09:56

## 2024-02-22 RX ADMIN — WATER 1000 MG: 1 INJECTION INTRAMUSCULAR; INTRAVENOUS; SUBCUTANEOUS at 09:56

## 2024-02-22 RX ADMIN — SODIUM CHLORIDE 1000 ML: 9 INJECTION, SOLUTION INTRAVENOUS at 09:55

## 2024-02-22 RX ADMIN — ONDANSETRON 4 MG: 2 INJECTION INTRAMUSCULAR; INTRAVENOUS at 09:56

## 2024-02-22 RX ADMIN — IOPAMIDOL 100 ML: 612 INJECTION, SOLUTION INTRAVENOUS at 10:54

## 2024-02-22 ASSESSMENT — ENCOUNTER SYMPTOMS
DIARRHEA: 0
VOMITING: 1
ABDOMINAL PAIN: 1
NAUSEA: 1

## 2024-02-22 ASSESSMENT — PAIN - FUNCTIONAL ASSESSMENT
PAIN_FUNCTIONAL_ASSESSMENT: NONE - DENIES PAIN
PAIN_FUNCTIONAL_ASSESSMENT: NONE - DENIES PAIN

## 2024-02-22 NOTE — ED PROVIDER NOTES
reviewed with the patient. All of pt's questions and concerns were addressed.  Alarm symptoms and return precautions associated with chief complaint and evaluation were reviewed with the patient in detail.  The patient demonstrated adequate understanding.  Patient was instructed to follow up with PCP/ GI in 2 days for reassessment, as well as given strict return precautions to the ED upon further deterioration. Patient is ready for discharge home.    Diagnosis     Clinical Impression:   1. Nausea and vomiting, unspecified vomiting type    2. Lower abdominal pain    3. Acute cystitis without hematuria        Disposition: home      [facility] EMERGENCY DEPT  [address] [facility] [id number] [phone]    If symptoms worsen    Verna Lucero MD  [address] [facility] [address] [phone]    Schedule an appointment as soon as possible for a visit in 2 days      [facility] [address]  [address]  [facility] [id number] [phone]  Schedule an appointment as soon as possible for a visit in 1 week            Medication List        START taking these medications      doxycycline hyclate 100 MG tablet  Commonly known as: VIBRA-TABS  Take 1 tablet by mouth 2 times daily for 7 days            CHANGE how you take these medications      * ondansetron 4 MG disintegrating tablet  Commonly known as: ZOFRAN-ODT  What changed: Another medication with the same name was added. Make sure you understand how and when to take each.     * ondansetron 4 MG disintegrating tablet  Commonly known as: ZOFRAN-ODT  Take 1 tablet by mouth 3 times daily as needed for Nausea or Vomiting  What changed: You were already taking a medication with the same name, and this prescription was added. Make sure you understand how and when to take each.           * This list has 2 medication(s) that are the same as other medications prescribed for you. Read the directions carefully, and ask

## 2024-02-22 NOTE — ED NOTES
Pt given his discharge papers verbalized understanding ,VSS. Pt's family came to pick him up,steady in gait, ambulatory

## 2024-02-22 NOTE — ED TRIAGE NOTES
Client reports havign N/V over past week. Client seen here at [facility] for N/V on Sunday and Wedn, given fluids and zofran during visit. Reports symptoms return when he goes home. AXOX4. Start of episode at 0300, client reports unable to advance his diet past liquids and soft food.

## 2024-02-22 NOTE — DISCHARGE INSTRUCTIONS
Maintain hydration by drinking small amounts of clear fluids frequently, then soft diet, and then advance diet as tolerated. May use OTC Imodium if desired for any diarrhea.  Call if symptoms worsen, high fever, severe weakness or fainting, increased abdominal pain, blood in stool or vomit, or failure to improve in 2-3 days.   Take medication as prescribed. Follow-up with your primary care physician within 2 days for reassessment. Bring the results from this visit with you for their review. Return to the ED immediately for any new, worsening, or persistent symptoms, including fever, vomiting, chest pain, shortness of breath, or any other medical concerns.

## 2024-07-16 ENCOUNTER — OFFICE VISIT (OUTPATIENT)
Age: 39
End: 2024-07-16
Payer: MEDICAID

## 2024-07-16 DIAGNOSIS — M75.21 BICEPS TENDINITIS OF RIGHT SHOULDER: Primary | ICD-10-CM

## 2024-07-16 DIAGNOSIS — M25.511 CHRONIC RIGHT SHOULDER PAIN: ICD-10-CM

## 2024-07-16 DIAGNOSIS — G89.29 CHRONIC RIGHT SHOULDER PAIN: ICD-10-CM

## 2024-07-16 PROCEDURE — 73030 X-RAY EXAM OF SHOULDER: CPT | Performed by: ORTHOPAEDIC SURGERY

## 2024-07-16 PROCEDURE — 99204 OFFICE O/P NEW MOD 45 MIN: CPT | Performed by: ORTHOPAEDIC SURGERY

## 2024-07-16 RX ORDER — MELOXICAM 15 MG/1
15 TABLET ORAL DAILY
Qty: 30 TABLET | Refills: 3 | Status: SHIPPED | OUTPATIENT
Start: 2024-07-16 | End: 2024-07-16 | Stop reason: CLARIF

## 2024-07-16 RX ORDER — MELOXICAM 15 MG/1
15 TABLET ORAL DAILY
Qty: 30 TABLET | Refills: 2 | Status: SHIPPED | OUTPATIENT
Start: 2024-07-16

## 2024-07-16 NOTE — PROGRESS NOTES
[facility] AMBULATORY OFFICE NOTE      Patient: Bill Mclaughlin                MRN: [id number]       SSN: [id number]  YOB: 1985        AGE: 38 y.o.        SEX: male  There is no height or weight on file to calculate BMI.    PCP: Verna Lucero MD  07/16/24    CHIEF COMPLAINT: Right shoulder pain    HPI: Bill Mclaughlin is a 38 y.o. male patient who complains of right shoulder pain off and on for the past 2 years.  His intermittent shoulder pain comes and goes.  He notices it worse in the morning some days.  He also notices stiffness at times.  Pain is mostly anterior in the shoulder.  No injury or trauma either remote or recently.    Past Medical History:   Diagnosis Date    Abdominal pain     Gastroenteritis     H/O emphysema (HCC)     Hypokalemia     Syncope and collapse        No family history on file.    Current Outpatient Medications   Medication Sig Dispense Refill    meloxicam (MOBIC) 15 MG tablet Take 1 tablet by mouth daily 30 tablet 2    ondansetron (ZOFRAN-ODT) 4 MG disintegrating tablet Take 1 tablet by mouth 3 times daily as needed for Nausea or Vomiting 21 tablet 0    famotidine (PEPCID) 20 MG tablet Take 1 tablet by mouth 2 times daily for 5 days 10 tablet 0    ondansetron (ZOFRAN) 4 MG tablet Take 1 tablet by mouth 3 times daily as needed for Nausea or Vomiting 10 tablet 0    ondansetron (ZOFRAN-ODT) 4 MG disintegrating tablet Take 4 mg by mouth every 4 hours as needed (Patient not taking: Reported on 2/15/2024)       No current facility-administered medications for this visit.       Allergies   Allergen Reactions    Ibuprofen Hives       No past surgical history on file.    Social History     Socioeconomic History    Marital status:      Spouse name: Not on file    Number of children: Not on file    Years of education: Not on file    Highest education level: Not on file   Occupational History    Not on file   Tobacco Use    Smoking

## 2025-01-14 ENCOUNTER — APPOINTMENT (OUTPATIENT)
Facility: HOSPITAL | Age: 40
End: 2025-01-14
Payer: MEDICAID

## 2025-01-14 ENCOUNTER — HOSPITAL ENCOUNTER (EMERGENCY)
Facility: HOSPITAL | Age: 40
Discharge: HOME | End: 2025-01-14
Attending: EMERGENCY MEDICINE
Payer: MEDICAID

## 2025-01-14 VITALS
WEIGHT: 160 LBS | HEIGHT: 73 IN | OXYGEN SATURATION: 97 % | RESPIRATION RATE: 18 BRPM | SYSTOLIC BLOOD PRESSURE: 132 MMHG | DIASTOLIC BLOOD PRESSURE: 48 MMHG | BODY MASS INDEX: 21.2 KG/M2 | HEART RATE: 93 BPM | TEMPERATURE: 99.3 F

## 2025-01-14 DIAGNOSIS — J06.9 ACUTE UPPER RESPIRATORY INFECTION: Primary | ICD-10-CM

## 2025-01-14 LAB
FLUAV RNA SPEC QL NAA+PROBE: NOT DETECTED
FLUBV RNA SPEC QL NAA+PROBE: NOT DETECTED
SARS-COV-2 RNA RESP QL NAA+PROBE: NOT DETECTED
SOURCE: NORMAL

## 2025-01-14 PROCEDURE — 87636 SARSCOV2 & INF A&B AMP PRB: CPT

## 2025-01-14 PROCEDURE — 71046 X-RAY EXAM CHEST 2 VIEWS: CPT

## 2025-01-14 PROCEDURE — 99284 EMERGENCY DEPT VISIT MOD MDM: CPT

## 2025-01-14 ASSESSMENT — LIFESTYLE VARIABLES
HOW MANY STANDARD DRINKS CONTAINING ALCOHOL DO YOU HAVE ON A TYPICAL DAY: 5 OR 6
HOW OFTEN DO YOU HAVE A DRINK CONTAINING ALCOHOL: 2-4 TIMES A MONTH

## 2025-01-14 ASSESSMENT — PAIN SCALES - GENERAL: PAINLEVEL_OUTOF10: 6

## 2025-01-14 ASSESSMENT — PAIN - FUNCTIONAL ASSESSMENT: PAIN_FUNCTIONAL_ASSESSMENT: 0-10

## 2025-01-14 NOTE — ED PROVIDER NOTES
ED Course as of 01/14/25 0530   Tue Jan 14, 2025   0328 Cough, [place] x 3 days  Afebrile  Flu exposure  Pending covid/flu result  Pending cxr review [NF]   0530 Negative covid/flu will dc [NF]      ED Course User Index  [NF] Stephanie Polk MD        5:30 AM :Pt care assumed from April  , ED provider. Pt complaint(s), current treatment plan, progression and available diagnostic results have been discussed thoroughly. The patient was seen and evaluated on my shift.   Rounding occurred: Yes  Intended Disposition: dc  Pending diagnostic reports and/or labs (please list): none       Stephanie Polk MD  01/14/25 0531

## 2025-05-01 ASSESSMENT — ENCOUNTER SYMPTOMS
RHINORRHEA: 0
VOMITING: 0
ABDOMINAL PAIN: 0
WHEEZING: 0
EYE REDNESS: 0
EYE DISCHARGE: 0
NAUSEA: 0
CONSTIPATION: 0
STRIDOR: 0
SORE THROAT: 0
BACK PAIN: 0
DIARRHEA: 0
SHORTNESS OF BREATH: 0
COUGH: 1